# Patient Record
Sex: FEMALE | Race: WHITE | URBAN - METROPOLITAN AREA
[De-identification: names, ages, dates, MRNs, and addresses within clinical notes are randomized per-mention and may not be internally consistent; named-entity substitution may affect disease eponyms.]

---

## 2024-07-01 ENCOUNTER — DOCUMENTATION ONLY (OUTPATIENT)
Dept: GERIATRICS | Facility: CLINIC | Age: 74
End: 2024-07-01

## 2024-07-01 ENCOUNTER — TRANSITIONAL CARE UNIT VISIT (OUTPATIENT)
Dept: GERIATRICS | Facility: CLINIC | Age: 74
End: 2024-07-01
Payer: COMMERCIAL

## 2024-07-01 VITALS
RESPIRATION RATE: 17 BRPM | WEIGHT: 216.6 LBS | DIASTOLIC BLOOD PRESSURE: 68 MMHG | OXYGEN SATURATION: 94 % | SYSTOLIC BLOOD PRESSURE: 105 MMHG | HEART RATE: 70 BPM | TEMPERATURE: 97.4 F

## 2024-07-01 DIAGNOSIS — D62 ACUTE BLOOD LOSS AS CAUSE OF POSTOPERATIVE ANEMIA: ICD-10-CM

## 2024-07-01 DIAGNOSIS — M97.8XXD PERIPROSTHETIC FRACTURE OF FEMUR FOLLOWING TOTAL REPLACEMENT OF HIP, SUBSEQUENT ENCOUNTER: ICD-10-CM

## 2024-07-01 DIAGNOSIS — M06.9 RHEUMATOID ARTHRITIS INVOLVING MULTIPLE SITES, UNSPECIFIED WHETHER RHEUMATOID FACTOR PRESENT (H): ICD-10-CM

## 2024-07-01 DIAGNOSIS — M15.0 PRIMARY OSTEOARTHRITIS INVOLVING MULTIPLE JOINTS: ICD-10-CM

## 2024-07-01 DIAGNOSIS — Z47.89 ORTHOPEDIC AFTERCARE: ICD-10-CM

## 2024-07-01 DIAGNOSIS — Z96.649 PERIPROSTHETIC FRACTURE OF FEMUR FOLLOWING TOTAL REPLACEMENT OF HIP, SUBSEQUENT ENCOUNTER: ICD-10-CM

## 2024-07-01 DIAGNOSIS — G60.9 IDIOPATHIC PERIPHERAL NEUROPATHY: ICD-10-CM

## 2024-07-01 DIAGNOSIS — Z96.642 STATUS POST TOTAL REPLACEMENT OF LEFT HIP: Primary | ICD-10-CM

## 2024-07-01 PROBLEM — M05.79 RHEUMATOID ARTHRITIS INVOLVING MULTIPLE SITES WITH POSITIVE RHEUMATOID FACTOR (H): Status: RESOLVED | Noted: 2024-07-01 | Resolved: 2024-07-01

## 2024-07-01 PROBLEM — M05.79 RHEUMATOID ARTHRITIS INVOLVING MULTIPLE SITES WITH POSITIVE RHEUMATOID FACTOR (H): Status: ACTIVE | Noted: 2024-07-01

## 2024-07-01 PROBLEM — S72.22XD CLOSED SUBTROCHANTERIC FRACTURE OF LEFT FEMUR WITH ROUTINE HEALING: Status: ACTIVE | Noted: 2024-07-01

## 2024-07-01 PROCEDURE — 99310 SBSQ NF CARE HIGH MDM 45: CPT | Performed by: NURSE PRACTITIONER

## 2024-07-01 RX ORDER — ASPIRIN 81 MG/1
81 TABLET ORAL 2 TIMES DAILY WITH MEALS
COMMUNITY

## 2024-07-01 RX ORDER — POTASSIUM CHLORIDE 1500 MG/1
20 TABLET, EXTENDED RELEASE ORAL DAILY
COMMUNITY

## 2024-07-01 RX ORDER — ISOSORBIDE DINITRATE 20 MG/1
20 TABLET ORAL
COMMUNITY

## 2024-07-01 RX ORDER — ACETAMINOPHEN 500 MG
1000 TABLET ORAL 3 TIMES DAILY
COMMUNITY
End: 2024-08-30 | Stop reason: DRUGHIGH

## 2024-07-01 RX ORDER — FUROSEMIDE 40 MG
40 TABLET ORAL DAILY
COMMUNITY

## 2024-07-01 RX ORDER — UBIDECARENONE 100 MG
100 CAPSULE ORAL DAILY
COMMUNITY

## 2024-07-01 RX ORDER — ATENOLOL 50 MG/1
50 TABLET ORAL 2 TIMES DAILY
COMMUNITY

## 2024-07-01 RX ORDER — NITROGLYCERIN 0.4 MG/1
0.4 TABLET SUBLINGUAL EVERY 5 MIN PRN
COMMUNITY

## 2024-07-01 RX ORDER — AMOXICILLIN 500 MG/1
2000 CAPSULE ORAL
COMMUNITY

## 2024-07-01 RX ORDER — OXYCODONE HYDROCHLORIDE 5 MG/1
5 CAPSULE ORAL EVERY 4 HOURS PRN
COMMUNITY
End: 2024-07-03

## 2024-07-01 RX ORDER — TRAZODONE HYDROCHLORIDE 100 MG/1
100 TABLET ORAL AT BEDTIME
COMMUNITY

## 2024-07-01 RX ORDER — HYDROXYCHLOROQUINE SULFATE 200 MG/1
200 TABLET, FILM COATED ORAL 2 TIMES DAILY
COMMUNITY

## 2024-07-01 RX ORDER — ROSUVASTATIN CALCIUM 20 MG/1
20 TABLET, COATED ORAL DAILY
COMMUNITY

## 2024-07-01 RX ORDER — IBUPROFEN 600 MG/1
600 TABLET, FILM COATED ORAL EVERY 6 HOURS PRN
COMMUNITY

## 2024-07-01 RX ORDER — FOLIC ACID 1 MG/1
2 TABLET ORAL DAILY
COMMUNITY

## 2024-07-01 RX ORDER — DOCUSATE SODIUM 100 MG/1
100 CAPSULE, LIQUID FILLED ORAL DAILY
COMMUNITY
End: 2024-07-05

## 2024-07-01 RX ORDER — METHOCARBAMOL 500 MG/1
500 TABLET, FILM COATED ORAL EVERY 6 HOURS PRN
COMMUNITY

## 2024-07-01 NOTE — LETTER
7/1/2024      Lana Moyer  [address] [facility] [id number]        [facility] [place]  PCP & CLINIC: No primary care provider on file.  Mary MRN: [id number]. Place of Service where encounter took place[facility] Lana Moyer  is a 73 year old  (1950), admitted to the above facility from  [facility] . Hospital stay 6/22 through 6/28. Admitted to this facility for rehab, medical management, and nursing care. HPI information obtained from: facility chart records, facility staff, patient report, [facility] chart review, and Care Everywhere [facility] chart review.     Brief Summary of Hospital Course: Lana Victor was admitted to [facility] on 6/22 after sustaining a periprosthetic, spiral fracture to her left femur. She underwent total hip arthroplasty and experienced acute surgical blood loss requring transfusion of 1 unit of pRBCs.     Updates since admission to transitional care unit: Lana Victor presented to TCU on 6/28/24 s/p the above hospitalization. Today, Lana Victor feels alright and is looking forward to beginning therapy. Her pain is adequately controlled but she states she is utilizing all of her pain medications regularly (Ibuprofen, Tylenol, methocarbamol, and oxycodone). Her pain is present at the left hip and all the way to the left knee and is rated 4/10 at the time of the visit. She denies feeling feverish, confused, dizzy, lightheaded or fatigued. Bilateral leg and foot tingling unchanged from patient's baseline from before surgery. Swelling of hands, legs, and feet is also normal today compared to her normal level of swelling. She has difficulty breathing when walking about one block or climbing a flight of stairs at baseline. She denies chest pain or heart skipping beats or beating out of her chest. Last bowel movement was today. No nausea, diarrhea or abdominal pain.    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), trigger thumb and long finger release (2009), right shoulder arthroscopy with acromioplasty (2017), L4-S1 posterior decompression and fusion (2019), L2-L4 posterior decompression, L3-L4 fusion (2022), Coronary stent placement to mid LAD, Right ring and long finger pulley release (2013), total knee arthroplasty, left (2019), total knee arthroplasty, right (2018), hip replacement, left (2023)  FAMILY HISTORY: Mother: heart disease, a fib, hypertension, arthritis; Brother: Hypertension, hyperlipidemia, coronary artery disease; Sister: Coronary artery disease with stent placement, hypertension  SOCIAL HISTORY:  Previous smoker, quit 18 years ago. No illicit drug use. Drinks alcohol occasionally, socially. Lives alone, with  living in long-term care facility. Daughter lives nearby and was primary caregiver after Lana's previous total hip surgery.   reports that she has quit smoking. Her smoking use included cigarettes. She does not have any smokeless tobacco history on file.  Post Discharge Medication Reconciliation Status: discharge medications reconciled and changed, per note/orders.  Current Outpatient Medications   Medication Sig Dispense Refill     acetaminophen (TYLENOL) 500 MG tablet Take 500-1,000 mg by mouth every 6 hours as needed for pain       amoxicillin (AMOXIL) 500 MG capsule Take 2,000 mg by mouth once as needed (1 hour prior to dental  appointment)       aspirin 81 MG EC tablet Take 81 mg by mouth 2 times daily (with meals)       atenolol (TENORMIN) 50 MG tablet Take 50 mg by mouth 2 times daily       Calcium Carb-Cholecalciferol (OS-MARLA CALCIUM + D3 PO) Take 1 tablet by mouth 2 times daily       co-enzyme Q-10 100 MG CAPS capsule Take 100 mg by mouth daily       docusate sodium (COLACE) 100 MG capsule Take 100 mg by mouth daily       DULoxetine HCl 60 MG CSDR Take 60 mg by mouth daily       folic acid (FOLVITE) 1 MG tablet Take 2 mg by mouth daily       furosemide (LASIX) 40 MG tablet Take 40 mg by mouth daily       Glucosamine-Chondroitin 166.7-133.3 MG CAPS Take 1 capsule by mouth 2 times daily       hydroxychloroquine (PLAQUENIL) 200 MG tablet Take 200 mg by mouth 2 times daily       ibuprofen (ADVIL/MOTRIN) 600 MG tablet Take 600 mg by mouth every 6 hours as needed for moderate pain       isosorbide dinitrate (ISORDIL) 20 MG tablet Take 20 mg by mouth 3 times daily       methocarbamol (ROBAXIN) 500 MG tablet Take 500 mg by mouth every 6 hours as needed for muscle spasms (1st line pain management)       methotrexate 1.25 MG half-tablet Take 20 mg by mouth once a week       nitroGLYcerin (NITROSTAT) 0.4 MG sublingual tablet Place 0.4 mg under the tongue every 5 minutes as needed for chest pain For chest pain place 1 tablet under the tongue every 5 minutes for 3 doses. If symptoms persist 5 minutes after 1st dose call 911.       oxyCODONE (OXY-IR) 5 MG capsule Take 1 capsule (5 mg) by mouth every 4 hours as needed for severe pain       potassium chloride ER (K-TAB) 20 MEQ CR tablet Take 20 mEq by mouth daily       rosuvastatin (CRESTOR) 20 MG tablet Take 20 mg by mouth daily       traZODone (DESYREL) 100 MG tablet Take 100 mg by mouth at bedtime       ROS: 10 point ROS of systems including Constitutional, Eyes, Respiratory, Cardiovascular, Gastroenterology, Genitourinary, Integumentary, Musculoskeletal, Psychiatric were all negative except for  pertinent positives noted in my HPI.    Vitals: /68 mmHg  Pulse 70 bpm  Temp 97.4 F  Resp 17  Exam:  GENERAL APPEARANCE: Alert, in no distress, cooperative  ENT: Mouth/posterior oropharynx intact w/ moist mucous membranes, hearing acuity [place] at baseline, using hearing aides.  EYES: EOM, conjunctivae, lids, pupils and irises normal  RESP: Respiratory effort normal, no respiratory distress, Lung sounds clear. On RA.   CV: Auscultation of heart reveals S1, S2, rate and rhythm regular, no murmur, no rub or gallop, Edema +1 BUE, +2 LLE, +1 RLE. Peripheral pulses are 2+. BLE extremities warm to the touch.  ABDOMEN: Bowel sounds active, abdomen is soft and non-tender  SKIN: Inspection/Palpation of skin and subcutaneous tissue baseline w/ fragility. Surgical incision L hip covered in dressing. Mild edema and tenderness to touch, but no ecchymoses or inflammation concerning for infection.  NEURO: Alert and oriented to all spheres. Cranial nerves II-XII grossly intact.  PSYCH: Insight, judgement, and memory are intact, affect and mood are pleasant and engaged, emotional about recurrent falls.    Lab/Diagnostic data: Recent labs in [facility] reviewed by me today.     ASSESSMENT/PLAN:  Primary osteoarthritis involving multiple joints  Status post total replacement of left hip  Periprosthetic fracture of femur following total replacement of hip, subsequent encounter  Orthopedic aftercare  Acute blood loss as cause of postoperative anemia  Rheumatoid arthritis involving multiple sites, unspecified whether rheumatoid factor present (H)  Idiopathic peripheral neuropathy  Acute on chronic. Complex.  Provider reviewed records from hospitalization, facility, and interpreted most recent imaging/lab work (such as Hgb, potassium, CMP), and vital signs, each with unique characteristics requiring MDM.   Provider discussed advanced care planning directly w/ Lana Victor. She has elected to be full code status, RACHNA signed on site and order  entered int EPIC.   Discharge orders for potassium have not been drawn yet despite orders for today:  Will obtain CBC and CMP to evaluate for hypokalemia, ongoing anemia, or any other post-op disturbance (especially given her post op blood loss requiring blood transfusion).   CMP to monitor effects of polypharmacy: Tylenol, methotrexate, aspirin affecting liver function; Oxycodone, ibuprofen, lasix affecting renal function.  Patient experiencing polypharmacy which could certainly contribute to recurrent falls:  Duloxetine is prescribed at higher than normal recommended dosing for peripheral neuropathy and rarely effective in doses over 60mg.   Discussed this with Lana Victor, who is open to having this medication decreased, as outlined below.  Recommended discontinuation of Coenzyme Q10 given lack of evidence to support its use. Lana Victor opts to continue this medication.  Trazodone 100 mg may contribute to falls, especially at night. She denies daytime sleepiness and has not had falls at night. She would like to continue trazodone at the current dose.  Considering narcotic use, decreased mobility from baseline, and changes in nutrition, which all increase risk of constipation.  Add senna-S as outlined below.   Pain management. Currently adequately controlled.  Continue with PRN ibuprofen, acetaminophen, methocarbamol. She has not used the 10 mg dose of oxycodone since arrival.   Will reduce oxycodone as outlined below.  If pain becomes stronger, especially as therapy picks up, consider scheduling methocarbamol, as this is her first-line pain management per orthopedics note.  Ortho surgery follow up with Dr. Clarke's team has been scheduled by Lana Victor in the coming week.  Noting weight gain over the weekend. Swelling of BLE apparently baseline for her. Reassuring that there is no acute change in cardiopulmonary status (eg, crackles on auscultation or increased dyspnea on exertion)  Referral for OT lymphedema assessment  and treatment  Follow up w/in 1 week or as needed.    Orders:  CMP x1, routine on 7/3; Dx: Polypharmacy, hypokalemia  CBC x1, routine on 7/3; Dx: Surgical blood loss anemia; VTE prophylaxis  OT lymphedema evaluate and treat x1; Dx: Edema  Reduce duloxetine to 60 mg PO daily; Dx: Neuropathy, depression  Senna-S 1 tablet BID PRN; Dx: Constipation   Decrease oxycodone to 5 mg PO Q4h PRN; Dx: Severe pain    Total time spent with patient visit was 45 min including patient visit, review of past records, and care coordination as noted above.     Christa Lr RN, Student YVETTE    I (the provider) was present with professional student who participated in this service and in the documentation of this service. I have verified the HPI/ROS, history, and personally performed the physical exam and the healthcare decision making. I agree with all elements as documented above.      Electronically signed by:  JOHN Willett CNP DNP        Sincerely,        JOHN Moe CNP

## 2024-07-01 NOTE — PROGRESS NOTES
[facility]  PCP & CLINIC: No primary care provider on file.  Mary MRN: [id number]. Place of Service where encounter took place:  [facility]. Lana Moyer  is a 73 year old  (1950), admitted to the above facility from  [facility] . Hospital stay 6/22 through 6/28. Admitted to this facility for rehab, medical management, and nursing care. HPI information obtained from: facility chart records, facility staff, patient report, [facility] chart review, and Care Everywhere [facility] chart review.     Brief Summary of Hospital Course: Lana Victor was admitted to [facility] on 6/22 after sustaining a periprosthetic, spiral fracture to her left femur. She underwent total hip arthroplasty and experienced acute surgical blood loss requring transfusion of 1 unit of pRBCs.     Updates since admission to transitional care unit: Lana Victor presented to TCU on 6/28/24 s/p the above hospitalization. Today, Lana Victor feels alright and is looking forward to beginning therapy. Her pain is adequately controlled but she states she is utilizing all of her pain medications regularly (Ibuprofen, Tylenol, methocarbamol, and oxycodone). Her pain is present at the left hip and all the way to the left knee and is rated 4/10 at the time of the visit. She denies feeling feverish, confused, dizzy, lightheaded or fatigued. Bilateral leg and foot tingling unchanged from patient's baseline from before surgery. Swelling of hands, legs, and feet is also normal today compared to her normal level of swelling. She has difficulty breathing when walking about one block or climbing a flight of stairs at baseline. She denies chest pain or heart skipping beats or beating out of her chest. Last bowel movement was today. No nausea, diarrhea or abdominal pain.    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), trigger thumb and long finger release (2009), right shoulder arthroscopy with acromioplasty (2017), L4-S1 posterior decompression and fusion (2019), L2-L4 posterior decompression, L3-L4 fusion (2022), Coronary stent placement to mid LAD, Right ring and long finger pulley release (2013), total knee arthroplasty, left (2019), total knee arthroplasty, right (2018), hip replacement, left (2023)  FAMILY HISTORY: Mother: heart disease, a fib, hypertension, arthritis; Brother: Hypertension, hyperlipidemia, coronary artery disease; Sister: Coronary artery disease with stent placement, hypertension  SOCIAL HISTORY:  Previous smoker, quit 18 years ago. No illicit drug use. Drinks alcohol occasionally, socially. Lives alone, with  living in long-term care facility. Daughter lives nearby and was primary caregiver after Lana's previous total hip surgery.   reports that she has quit smoking. Her smoking use included cigarettes. She does not have any smokeless tobacco history on file.  Post Discharge Medication Reconciliation Status: discharge medications reconciled and changed, per note/orders.  Current Outpatient Medications   Medication Sig Dispense Refill    acetaminophen (TYLENOL) 500 MG tablet Take 500-1,000 mg by mouth every 6 hours as needed for pain      amoxicillin (AMOXIL) 500 MG capsule Take 2,000 mg by mouth once as needed (1 hour prior to dental appointment)      aspirin 81 MG EC tablet Take 81 mg by mouth 2 times daily (with  meals)      atenolol (TENORMIN) 50 MG tablet Take 50 mg by mouth 2 times daily      Calcium Carb-Cholecalciferol (OS-MARLA CALCIUM + D3 PO) Take 1 tablet by mouth 2 times daily      co-enzyme Q-10 100 MG CAPS capsule Take 100 mg by mouth daily      docusate sodium (COLACE) 100 MG capsule Take 100 mg by mouth daily      DULoxetine HCl 60 MG CSDR Take 60 mg by mouth daily      folic acid (FOLVITE) 1 MG tablet Take 2 mg by mouth daily      furosemide (LASIX) 40 MG tablet Take 40 mg by mouth daily      Glucosamine-Chondroitin 166.7-133.3 MG CAPS Take 1 capsule by mouth 2 times daily      hydroxychloroquine (PLAQUENIL) 200 MG tablet Take 200 mg by mouth 2 times daily      ibuprofen (ADVIL/MOTRIN) 600 MG tablet Take 600 mg by mouth every 6 hours as needed for moderate pain      isosorbide dinitrate (ISORDIL) 20 MG tablet Take 20 mg by mouth 3 times daily      methocarbamol (ROBAXIN) 500 MG tablet Take 500 mg by mouth every 6 hours as needed for muscle spasms (1st line pain management)      methotrexate 1.25 MG half-tablet Take 20 mg by mouth once a week      nitroGLYcerin (NITROSTAT) 0.4 MG sublingual tablet Place 0.4 mg under the tongue every 5 minutes as needed for chest pain For chest pain place 1 tablet under the tongue every 5 minutes for 3 doses. If symptoms persist 5 minutes after 1st dose call 911.      oxyCODONE (OXY-IR) 5 MG capsule Take 1 capsule (5 mg) by mouth every 4 hours as needed for severe pain      potassium chloride ER (K-TAB) 20 MEQ CR tablet Take 20 mEq by mouth daily      rosuvastatin (CRESTOR) 20 MG tablet Take 20 mg by mouth daily      traZODone (DESYREL) 100 MG tablet Take 100 mg by mouth at bedtime       ROS: 10 point ROS of systems including Constitutional, Eyes, Respiratory, Cardiovascular, Gastroenterology, Genitourinary, Integumentary, Musculoskeletal, Psychiatric were all negative except for pertinent positives noted in my HPI.    Vitals: /68 mmHg  Pulse 70 bpm  Temp 97.4 F  Resp  17  Exam:  GENERAL APPEARANCE: Alert, in no distress, cooperative  ENT: Mouth/posterior oropharynx intact w/ moist mucous membranes, hearing acuity [facility] at baseline, using hearing aides.  EYES: EOM, conjunctivae, lids, pupils and irises normal  RESP: Respiratory effort normal, no respiratory distress, Lung sounds clear. On RA.   CV: Auscultation of heart reveals S1, S2, rate and rhythm regular, no murmur, no rub or gallop, Edema +1 BUE, +2 LLE, +1 RLE. Peripheral pulses are 2+. BLE extremities warm to the touch.  ABDOMEN: Bowel sounds active, abdomen is soft and non-tender  SKIN: Inspection/Palpation of skin and subcutaneous tissue baseline w/ fragility. Surgical incision L hip covered in dressing. Mild edema and tenderness to touch, but no ecchymoses or inflammation concerning for infection.  NEURO: Alert and oriented to all spheres. Cranial nerves II-XII grossly intact.  PSYCH: Insight, judgement, and memory are intact, affect and mood are pleasant and engaged, emotional about recurrent falls.    Lab/Diagnostic data: Recent labs in [facility] reviewed by me today.     ASSESSMENT/PLAN:  Primary osteoarthritis involving multiple joints  Status post total replacement of left hip  Periprosthetic fracture of femur following total replacement of hip, subsequent encounter  Orthopedic aftercare  Acute blood loss as cause of postoperative anemia  Rheumatoid arthritis involving multiple sites, unspecified whether rheumatoid factor present (H)  Idiopathic peripheral neuropathy  Acute on chronic. Complex.  Provider reviewed records from hospitalization, facility, and interpreted most recent imaging/lab work (such as Hgb, potassium, CMP), and vital signs, each with unique characteristics requiring MDM.   Provider discussed advanced care planning directly w/ Lana Victor. She has elected to be full code status, RACHNA signed on site and order entered int EPIC.   Discharge orders for potassium have not been drawn yet despite orders for  today:  Will obtain CBC and CMP to evaluate for hypokalemia, ongoing anemia, or any other post-op disturbance (especially given her post op blood loss requiring blood transfusion).   CMP to monitor effects of polypharmacy: Tylenol, methotrexate, aspirin affecting liver function; Oxycodone, ibuprofen, lasix affecting renal function.  Patient experiencing polypharmacy which could certainly contribute to recurrent falls:  Duloxetine is prescribed at higher than normal recommended dosing for peripheral neuropathy and rarely effective in doses over 60mg.   Discussed this with Lana Victor, who is open to having this medication decreased, as outlined below.  Recommended discontinuation of Coenzyme Q10 given lack of evidence to support its use. Lana Vcitor opts to continue this medication.  Trazodone 100 mg may contribute to falls, especially at night. She denies daytime sleepiness and has not had falls at night. She would like to continue trazodone at the current dose.  Considering narcotic use, decreased mobility from baseline, and changes in nutrition, which all increase risk of constipation.  Add senna-S as outlined below.   Pain management. Currently adequately controlled.  Continue with PRN ibuprofen, acetaminophen, methocarbamol. She has not used the 10 mg dose of oxycodone since arrival.   Will reduce oxycodone as outlined below.  If pain becomes stronger, especially as therapy picks up, consider scheduling methocarbamol, as this is her first-line pain management per orthopedics note.  Ortho surgery follow up with Dr. Clarke's team has been scheduled by Lana Victor in the coming week.  Noting weight gain over the weekend. Swelling of BLE apparently baseline for her. Reassuring that there is no acute change in cardiopulmonary status (eg, crackles on auscultation or increased dyspnea on exertion)  Referral for OT lymphedema assessment and treatment  Follow up w/in 1 week or as needed.    Orders:  CMP x1, routine on 7/3; Dx:  Polypharmacy, hypokalemia  CBC x1, routine on 7/3; Dx: Surgical blood loss anemia; VTE prophylaxis  OT lymphedema evaluate and treat x1; Dx: Edema  Reduce duloxetine to 60 mg PO daily; Dx: Neuropathy, depression  Senna-S 1 tablet BID PRN; Dx: Constipation   Decrease oxycodone to 5 mg PO Q4h PRN; Dx: Severe pain    Total time spent with patient visit was 45 min including patient visit, review of past records, and care coordination as noted above.     Christa Lr RN, Student FNP    I (the provider) was present with professional student who participated in this service and in the documentation of this service. I have verified the HPI/ROS, history, and personally performed the physical exam and the healthcare decision making. I agree with all elements as documented above.      Electronically signed by:  JOHN Willett CNP DNP

## 2024-07-02 ENCOUNTER — LAB REQUISITION (OUTPATIENT)
Dept: LAB | Facility: CLINIC | Age: 74
End: 2024-07-02

## 2024-07-02 DIAGNOSIS — D64.9 ANEMIA, UNSPECIFIED: ICD-10-CM

## 2024-07-02 DIAGNOSIS — E87.6 HYPOKALEMIA: ICD-10-CM

## 2024-07-03 LAB
ALBUMIN SERPL BCG-MCNC: 2.9 G/DL (ref 3.5–5.2)
ALP SERPL-CCNC: 68 U/L (ref 40–150)
ALT SERPL W P-5'-P-CCNC: 36 U/L (ref 0–50)
ANION GAP SERPL CALCULATED.3IONS-SCNC: 9 MMOL/L (ref 7–15)
AST SERPL W P-5'-P-CCNC: 38 U/L (ref 0–45)
BILIRUB SERPL-MCNC: 0.4 MG/DL
BUN SERPL-MCNC: 13.6 MG/DL (ref 8–23)
CALCIUM SERPL-MCNC: 8.4 MG/DL (ref 8.8–10.2)
CHLORIDE SERPL-SCNC: 104 MMOL/L (ref 98–107)
CREAT SERPL-MCNC: 0.69 MG/DL (ref 0.51–0.95)
DEPRECATED HCO3 PLAS-SCNC: 25 MMOL/L (ref 22–29)
EGFRCR SERPLBLD CKD-EPI 2021: >90 ML/MIN/1.73M2
ERYTHROCYTE [DISTWIDTH] IN BLOOD BY AUTOMATED COUNT: 14.7 % (ref 10–15)
GLUCOSE SERPL-MCNC: 93 MG/DL (ref 70–99)
HCT VFR BLD AUTO: 22.7 % (ref 35–47)
HGB BLD-MCNC: 7 G/DL (ref 11.7–15.7)
MCH RBC QN AUTO: 31.1 PG (ref 26.5–33)
MCHC RBC AUTO-ENTMCNC: 30.8 G/DL (ref 31.5–36.5)
MCV RBC AUTO: 101 FL (ref 78–100)
PLATELET # BLD AUTO: 218 10E3/UL (ref 150–450)
POTASSIUM SERPL-SCNC: 3.7 MMOL/L (ref 3.4–5.3)
PROT SERPL-MCNC: 5.4 G/DL (ref 6.4–8.3)
RBC # BLD AUTO: 2.25 10E6/UL (ref 3.8–5.2)
SODIUM SERPL-SCNC: 138 MMOL/L (ref 135–145)
WBC # BLD AUTO: 6.8 10E3/UL (ref 4–11)

## 2024-07-03 PROCEDURE — 36415 COLL VENOUS BLD VENIPUNCTURE: CPT | Performed by: FAMILY MEDICINE

## 2024-07-03 PROCEDURE — P9604 ONE-WAY ALLOW PRORATED TRIP: HCPCS | Performed by: FAMILY MEDICINE

## 2024-07-03 PROCEDURE — 80053 COMPREHEN METABOLIC PANEL: CPT | Performed by: FAMILY MEDICINE

## 2024-07-03 PROCEDURE — 85027 COMPLETE CBC AUTOMATED: CPT | Performed by: FAMILY MEDICINE

## 2024-07-03 RX ORDER — OXYCODONE HYDROCHLORIDE 5 MG/1
5 CAPSULE ORAL EVERY 4 HOURS PRN
Status: SHIPPED
Start: 2024-07-03 | End: 2024-07-05

## 2024-07-03 NOTE — PROGRESS NOTES
[facility] GERIATRIC SERVICE  Episodic/Acute/Follow-Up  [place] MRN: [id number]. Place of Service where encounter took place:  [facility] ([facility]) [[id number]]   Chief Complaint   Patient presents with    RECHECK    HPI: Lana Moyer  is a 73 year old (1950), who is being seen today for an episodic care visit. Today's concern is:    Lana Victor seen today on routine follow up as she continues to rehab in TCU. During our last visit, he establish care via admission, trended blood work, reduced duloxetine, and adjusted pain/bowel regimen.    Today, Lana says that she is still constipated.  She is having bowel movements, but not as regular as she should and feels like she should empty more.  She denies bladder dysfunction.  She denies headaches, dizziness, shortness of breath, chest pain, fever, chills.  She feels she has a little bit of swelling in the left lower extremity still, but her pain is overall improving.  She likes most of the therapy that she is getting, and feels like she is making progress.  She denies any blood loss from stool or urine, and she denies any drainage from incision.  She denies feeling weaker than usual.    Past Medical and Surgical History reviewed in Epic today.  MEDICATIONS:  Current Outpatient Medications   Medication Sig Dispense Refill    ferrous sulfate (FE TABS) 325 (65 Fe) MG EC tablet Take 325 mg by mouth daily      oxyCODONE (OXY-IR) 5 MG capsule Take 1 capsule (5 mg) by mouth every 6 hours as needed for severe pain 15 capsule 0    senna-docusate (SENOKOT-S/PERICOLACE) 8.6-50 MG tablet Take 1 tablet by mouth 2 times daily +BID PRN      acetaminophen (TYLENOL) 500 MG tablet Take 1,000 mg by mouth 2 times daily +1000mg PO Qday PRN      amoxicillin (AMOXIL) 500 MG capsule Take 2,000 mg by mouth once as needed (1 hour prior to dental appointment)      aspirin 81 MG EC tablet Take 81 mg by mouth 2 times daily (with meals)      atenolol (TENORMIN) 50 MG tablet Take 50 mg by  "mouth 2 times daily      Calcium Carb-Cholecalciferol (OS-MARLA CALCIUM + D3 PO) Take 1 tablet by mouth 2 times daily      co-enzyme Q-10 100 MG CAPS capsule Take 100 mg by mouth daily      DULoxetine HCl 60 MG CSDR Take 60 mg by mouth daily      folic acid (FOLVITE) 1 MG tablet Take 2 mg by mouth daily      furosemide (LASIX) 40 MG tablet Take 40 mg by mouth daily      Glucosamine-Chondroitin 166.7-133.3 MG CAPS Take 1 capsule by mouth 2 times daily      hydroxychloroquine (PLAQUENIL) 200 MG tablet Take 200 mg by mouth 2 times daily      ibuprofen (ADVIL/MOTRIN) 600 MG tablet Take 600 mg by mouth every 6 hours as needed for moderate pain      isosorbide dinitrate (ISORDIL) 20 MG tablet Take 20 mg by mouth 3 times daily      methocarbamol (ROBAXIN) 500 MG tablet Take 500 mg by mouth every 6 hours as needed for muscle spasms (1st line pain management)      methotrexate 1.25 MG half-tablet Take 20 mg by mouth once a week      nitroGLYcerin (NITROSTAT) 0.4 MG sublingual tablet Place 0.4 mg under the tongue every 5 minutes as needed for chest pain For chest pain place 1 tablet under the tongue every 5 minutes for 3 doses. If symptoms persist 5 minutes after 1st dose call 911.      potassium chloride ER (K-TAB) 20 MEQ CR tablet Take 20 mEq by mouth daily      rosuvastatin (CRESTOR) 20 MG tablet Take 20 mg by mouth daily      traZODone (DESYREL) 100 MG tablet Take 100 mg by mouth at bedtime       Objective: /65   Pulse 68   Temp 97.9  F (36.6  C)   Resp 16   Ht 1.626 m (5' 4\")   Wt 94.6 kg (208 lb 8 oz)   SpO2 97%   BMI 35.79 kg/m    Exam:  GENERAL APPEARANCE: Alert, in no distress, cooperative.   RESP: Respiratory effort good, no respiratory distress, On RA.   CV: Edema 1-2+ BLE. Peripheral pulses are 2+.  Skin: Incision is CDI/NUHA/approximated with glue.  PSYCH: Insight, judgement, and memory are baseline, affect and mood are happy/engaged.    ASSESSMENT/PLAN:  Status post total replacement of left " hip  Periprosthetic fracture of femur following total replacement of hip, subsequent encounter  Orthopedic aftercare  Acute blood loss as cause of postoperative anemia  Rheumatoid arthritis involving multiple sites, unspecified whether rheumatoid factor present (H)  Primary osteoarthritis involving multiple joints  Acute on chronic.  Tenuous.  Provider reviewed records from facility, and interpreted most recent imaging/lab work (such as CBC, MP)), and vital signs, each with their own characteristics requiring MDM.  Provider discussed care with rehabilitation team and nursing:  Rehab team shows that Lana is slow to respond to rehab.  Sometimes she is still requiring a mechanical lift and perhaps this is because of her toe-touch weightbearing restriction.  She will require more time.  Nursing reports moderate amount of opioid use for pain control, though Lana endorses pain improving.  Reduce oxycodone as noted below.  Schedule Tylenol to mitigate use of opioids.  Noting some ongoing constipation postoperatively.  Schedule senna as noted below while keeping PRN dosing as previously ordered.  Given the above, discontinue Colace.  Noting significant hemoglobin drop from prior.  No obvious signs of blood loss.  Hemoglobin dropped from 8.2-7.0.  Provider counseled Lana on potential for hospital transfer/blood transfusion if this does not improve and drops further, and also discussed monitoring in house.  Start FeSO4 as noted below.  This can constipate and be stomach upsetting, and patient was educated around this.  Take with food, and utilize stool softeners as needed.  Trend hemoglobin as noted below.  Follow up w/in 1 week or as needed.    Orders:  Decrease Oxycodone to 5mg PO Q6h PRN. Dx: severe pain.  Tylenol 1000mg PO BID. Dx: left hip pain.  Decrease PRN Tylenol to 1000mg PO Qday PRN. Dx: pain/fever.  Discontinue Colace.   Senna S, 1 tab PO BID. Dx: constipation. (Keep other PRN also).   FeSO4 325mg PO Qday. Dx:  postop anemia.   Recheck Hgb x1 on 7/8. Dx: postop anemia.     Electronically signed by:  Dr. Uyen Chahal, APRN CNP DNP

## 2024-07-05 ENCOUNTER — TRANSITIONAL CARE UNIT VISIT (OUTPATIENT)
Dept: GERIATRICS | Facility: CLINIC | Age: 74
End: 2024-07-05
Payer: COMMERCIAL

## 2024-07-05 VITALS
WEIGHT: 208.5 LBS | OXYGEN SATURATION: 97 % | TEMPERATURE: 97.9 F | SYSTOLIC BLOOD PRESSURE: 123 MMHG | RESPIRATION RATE: 16 BRPM | HEIGHT: 64 IN | BODY MASS INDEX: 35.59 KG/M2 | HEART RATE: 68 BPM | DIASTOLIC BLOOD PRESSURE: 65 MMHG

## 2024-07-05 DIAGNOSIS — M15.0 PRIMARY OSTEOARTHRITIS INVOLVING MULTIPLE JOINTS: ICD-10-CM

## 2024-07-05 DIAGNOSIS — Z96.642 STATUS POST TOTAL REPLACEMENT OF LEFT HIP: Primary | ICD-10-CM

## 2024-07-05 DIAGNOSIS — Z96.649 PERIPROSTHETIC FRACTURE OF FEMUR FOLLOWING TOTAL REPLACEMENT OF HIP, SUBSEQUENT ENCOUNTER: ICD-10-CM

## 2024-07-05 DIAGNOSIS — Z47.89 ORTHOPEDIC AFTERCARE: ICD-10-CM

## 2024-07-05 DIAGNOSIS — M97.8XXD PERIPROSTHETIC FRACTURE OF FEMUR FOLLOWING TOTAL REPLACEMENT OF HIP, SUBSEQUENT ENCOUNTER: ICD-10-CM

## 2024-07-05 DIAGNOSIS — M06.9 RHEUMATOID ARTHRITIS INVOLVING MULTIPLE SITES, UNSPECIFIED WHETHER RHEUMATOID FACTOR PRESENT (H): ICD-10-CM

## 2024-07-05 DIAGNOSIS — D62 ACUTE BLOOD LOSS AS CAUSE OF POSTOPERATIVE ANEMIA: ICD-10-CM

## 2024-07-05 PROCEDURE — 99310 SBSQ NF CARE HIGH MDM 45: CPT | Performed by: NURSE PRACTITIONER

## 2024-07-05 RX ORDER — AMOXICILLIN 250 MG
1 CAPSULE ORAL 2 TIMES DAILY
COMMUNITY

## 2024-07-05 RX ORDER — FERROUS SULFATE 325(65) MG
325 TABLET, DELAYED RELEASE (ENTERIC COATED) ORAL DAILY
COMMUNITY

## 2024-07-05 RX ORDER — OXYCODONE HYDROCHLORIDE 5 MG/1
5 CAPSULE ORAL EVERY 6 HOURS PRN
Qty: 15 CAPSULE | Refills: 0 | Status: SHIPPED | OUTPATIENT
Start: 2024-07-05 | End: 2024-07-18

## 2024-07-05 NOTE — LETTER
7/5/2024      Lana Moyer  [address] [facility] [address]        [facility] GERIATRIC SERVICE  Episodic/Acute/Follow-Up  [place] MRN: [id number]. Place of Service where encounter took place:  [facility] ([facility]) [[id number]]   Chief Complaint   Patient presents with     RECHECK    HPI: Lana Moyer  is a 73 year old (1950), who is being seen today for an episodic care visit. Today's concern is:    Lana Victor seen today on routine follow up as she continues to rehab in TCU. During our last visit, he establish care via admission, trended blood work, reduced duloxetine, and adjusted pain/bowel regimen.    Today, Lana says that she is still constipated.  She is having bowel movements, but not as regular as she should and feels like she should empty more.  She denies bladder dysfunction.  She denies headaches, dizziness, shortness of breath, chest pain, fever, chills.  She feels she has a little bit of swelling in the left lower extremity still, but her pain is overall improving.  She likes most of the therapy that she is getting, and feels like she is making progress.  She denies any blood loss from stool or urine, and she denies any drainage from incision.  She denies feeling weaker than usual.    Past Medical and Surgical History reviewed in Epic today.  MEDICATIONS:  Current Outpatient Medications   Medication Sig Dispense Refill     ferrous sulfate (FE TABS) 325 (65 Fe) MG EC tablet Take 325 mg by mouth daily       oxyCODONE (OXY-IR) 5 MG capsule Take 1 capsule (5 mg) by mouth every 6 hours as needed for severe pain 15 capsule 0     senna-docusate (SENOKOT-S/PERICOLACE) 8.6-50 MG tablet Take 1 tablet by mouth 2 times daily +BID PRN       acetaminophen (TYLENOL) 500 MG tablet Take 1,000 mg by mouth 2 times daily +1000mg PO Qday PRN       amoxicillin (AMOXIL) 500 MG capsule Take 2,000 mg by mouth once as needed (1 hour prior to dental appointment)       aspirin 81 MG EC tablet Take 81 mg by  "mouth 2 times daily (with meals)       atenolol (TENORMIN) 50 MG tablet Take 50 mg by mouth 2 times daily       Calcium Carb-Cholecalciferol (OS-MARLA CALCIUM + D3 PO) Take 1 tablet by mouth 2 times daily       co-enzyme Q-10 100 MG CAPS capsule Take 100 mg by mouth daily       DULoxetine HCl 60 MG CSDR Take 60 mg by mouth daily       folic acid (FOLVITE) 1 MG tablet Take 2 mg by mouth daily       furosemide (LASIX) 40 MG tablet Take 40 mg by mouth daily       Glucosamine-Chondroitin 166.7-133.3 MG CAPS Take 1 capsule by mouth 2 times daily       hydroxychloroquine (PLAQUENIL) 200 MG tablet Take 200 mg by mouth 2 times daily       ibuprofen (ADVIL/MOTRIN) 600 MG tablet Take 600 mg by mouth every 6 hours as needed for moderate pain       isosorbide dinitrate (ISORDIL) 20 MG tablet Take 20 mg by mouth 3 times daily       methocarbamol (ROBAXIN) 500 MG tablet Take 500 mg by mouth every 6 hours as needed for muscle spasms (1st line pain management)       methotrexate 1.25 MG half-tablet Take 20 mg by mouth once a week       nitroGLYcerin (NITROSTAT) 0.4 MG sublingual tablet Place 0.4 mg under the tongue every 5 minutes as needed for chest pain For chest pain place 1 tablet under the tongue every 5 minutes for 3 doses. If symptoms persist 5 minutes after 1st dose call 911.       potassium chloride ER (K-TAB) 20 MEQ CR tablet Take 20 mEq by mouth daily       rosuvastatin (CRESTOR) 20 MG tablet Take 20 mg by mouth daily       traZODone (DESYREL) 100 MG tablet Take 100 mg by mouth at bedtime       Objective: /65   Pulse 68   Temp 97.9  F (36.6  C)   Resp 16   Ht 1.626 m (5' 4\")   Wt 94.6 kg (208 lb 8 oz)   SpO2 97%   BMI 35.79 kg/m    Exam:  GENERAL APPEARANCE: Alert, in no distress, cooperative.   RESP: Respiratory effort good, no respiratory distress, On RA.   CV: Edema 1-2+ BLE. Peripheral pulses are 2+.  Skin: Incision is CDI/NUHA/approximated with glue.  PSYCH: Insight, judgement, and memory are baseline, " affect and mood are happy/engaged.    ASSESSMENT/PLAN:  Status post total replacement of left hip  Periprosthetic fracture of femur following total replacement of hip, subsequent encounter  Orthopedic aftercare  Acute blood loss as cause of postoperative anemia  Rheumatoid arthritis involving multiple sites, unspecified whether rheumatoid factor present (H)  Primary osteoarthritis involving multiple joints  Acute on chronic.  Tenuous.  Provider reviewed records from facility, and interpreted most recent imaging/lab work (such as CBC, MP)), and vital signs, each with their own characteristics requiring MDM.  Provider discussed care with rehabilitation team and nursing:  Rehab team shows that Lana is slow to respond to rehab.  Sometimes she is still requiring a mechanical lift and perhaps this is because of her toe-touch weightbearing restriction.  She will require more time.  Nursing reports moderate amount of opioid use for pain control, though Lana endorses pain improving.  Reduce oxycodone as noted below.  Schedule Tylenol to mitigate use of opioids.  Noting some ongoing constipation postoperatively.  Schedule senna as noted below while keeping PRN dosing as previously ordered.  Given the above, discontinue Colace.  Noting significant hemoglobin drop from prior.  No obvious signs of blood loss.  Hemoglobin dropped from 8.2-7.0.  Provider counseled Lana on potential for hospital transfer/blood transfusion if this does not improve and drops further, and also discussed monitoring in house.  Start FeSO4 as noted below.  This can constipate and be stomach upsetting, and patient was educated around this.  Take with food, and utilize stool softeners as needed.  Trend hemoglobin as noted below.  Follow up w/in 1 week or as needed.    Orders:  Decrease Oxycodone to 5mg PO Q6h PRN. Dx: severe pain.  Tylenol 1000mg PO BID. Dx: left hip pain.  Decrease PRN Tylenol to 1000mg PO Qday PRN. Dx: pain/fever.  Discontinue Colace.    Senna S, 1 tab PO BID. Dx: constipation. (Keep other PRN also).   FeSO4 325mg PO Qday. Dx: postop anemia.   Recheck Hgb x1 on 7/8. Dx: postop anemia.     Electronically signed by:  Dr. Uyen Chahal, APRN CNP DNP        Sincerely,        Uyen Chahal, JOHN CNP

## 2024-07-07 ENCOUNTER — LAB REQUISITION (OUTPATIENT)
Dept: LAB | Facility: CLINIC | Age: 74
End: 2024-07-07

## 2024-07-07 DIAGNOSIS — D64.9 ANEMIA, UNSPECIFIED: ICD-10-CM

## 2024-07-08 ENCOUNTER — TRANSITIONAL CARE UNIT VISIT (OUTPATIENT)
Dept: GERIATRICS | Facility: CLINIC | Age: 74
End: 2024-07-08
Payer: COMMERCIAL

## 2024-07-08 VITALS
SYSTOLIC BLOOD PRESSURE: 97 MMHG | TEMPERATURE: 97.7 F | OXYGEN SATURATION: 95 % | HEART RATE: 70 BPM | RESPIRATION RATE: 20 BRPM | DIASTOLIC BLOOD PRESSURE: 48 MMHG

## 2024-07-08 DIAGNOSIS — D62 ACUTE BLOOD LOSS AS CAUSE OF POSTOPERATIVE ANEMIA: ICD-10-CM

## 2024-07-08 DIAGNOSIS — Z96.649 PERIPROSTHETIC FRACTURE OF FEMUR FOLLOWING TOTAL REPLACEMENT OF HIP, SUBSEQUENT ENCOUNTER: ICD-10-CM

## 2024-07-08 DIAGNOSIS — Z47.89 ORTHOPEDIC AFTERCARE: ICD-10-CM

## 2024-07-08 DIAGNOSIS — M06.9 RHEUMATOID ARTHRITIS INVOLVING MULTIPLE SITES, UNSPECIFIED WHETHER RHEUMATOID FACTOR PRESENT (H): ICD-10-CM

## 2024-07-08 DIAGNOSIS — M97.8XXD PERIPROSTHETIC FRACTURE OF FEMUR FOLLOWING TOTAL REPLACEMENT OF HIP, SUBSEQUENT ENCOUNTER: ICD-10-CM

## 2024-07-08 DIAGNOSIS — Z96.642 STATUS POST TOTAL REPLACEMENT OF LEFT HIP: Primary | ICD-10-CM

## 2024-07-08 DIAGNOSIS — M15.0 PRIMARY OSTEOARTHRITIS INVOLVING MULTIPLE JOINTS: ICD-10-CM

## 2024-07-08 LAB
BASOPHILS # BLD AUTO: 0 10E3/UL (ref 0–0.2)
BASOPHILS NFR BLD AUTO: 1 %
EOSINOPHIL # BLD AUTO: 0.2 10E3/UL (ref 0–0.7)
EOSINOPHIL NFR BLD AUTO: 3 %
ERYTHROCYTE [DISTWIDTH] IN BLOOD BY AUTOMATED COUNT: 15.6 % (ref 10–15)
HCT VFR BLD AUTO: 23.4 % (ref 35–47)
HGB BLD-MCNC: 7.3 G/DL (ref 11.7–15.7)
HGB BLD-MCNC: 7.4 G/DL (ref 11.7–15.7)
IMM GRANULOCYTES # BLD: 0 10E3/UL
IMM GRANULOCYTES NFR BLD: 0 %
LYMPHOCYTES # BLD AUTO: 0.3 10E3/UL (ref 0.8–5.3)
LYMPHOCYTES NFR BLD AUTO: 6 %
MCH RBC QN AUTO: 31.2 PG (ref 26.5–33)
MCHC RBC AUTO-ENTMCNC: 31.2 G/DL (ref 31.5–36.5)
MCV RBC AUTO: 100 FL (ref 78–100)
MONOCYTES # BLD AUTO: 0.3 10E3/UL (ref 0–1.3)
MONOCYTES NFR BLD AUTO: 5 %
NEUTROPHILS # BLD AUTO: 5.1 10E3/UL (ref 1.6–8.3)
NEUTROPHILS NFR BLD AUTO: 86 %
NRBC # BLD AUTO: 0 10E3/UL
NRBC BLD AUTO-RTO: 0 /100
PLATELET # BLD AUTO: 203 10E3/UL (ref 150–450)
RBC # BLD AUTO: 2.34 10E6/UL (ref 3.8–5.2)
WBC # BLD AUTO: 6 10E3/UL (ref 4–11)

## 2024-07-08 PROCEDURE — 99310 SBSQ NF CARE HIGH MDM 45: CPT | Performed by: NURSE PRACTITIONER

## 2024-07-08 PROCEDURE — 85025 COMPLETE CBC W/AUTO DIFF WBC: CPT | Performed by: NURSE PRACTITIONER

## 2024-07-08 PROCEDURE — 85004 AUTOMATED DIFF WBC COUNT: CPT | Performed by: NURSE PRACTITIONER

## 2024-07-08 PROCEDURE — P9604 ONE-WAY ALLOW PRORATED TRIP: HCPCS | Performed by: NURSE PRACTITIONER

## 2024-07-08 NOTE — LETTER
7/8/2024      Lana Moyer  [address]  [address]  [facility] [address]        [facility] [place] GERIATRIC SERVICE  Episodic/Acute/Follow-Up  [place] MRN: [id number]. Place of Service where encounter took place:  [facility] ([facility]) [[id number]]   Chief Complaint   Patient presents with     RECHECK    HPI: Lana Moyer  is a 73 year old (1950), who is being seen today for an episodic care visit. Today's concern is:    Lana Victor seen today on routine follow up as she continues to rehab in TCU. During our last visit, we discussed significant anemia down to 7.0.  We started oxycodone reduction and adjunct of this with scheduled Tylenol.  We scheduled some senna and FeSO4 secondary to anemia and constipation.  Hemoglobin recheck is scheduled for today.    Today, Lana Victor is feeling pretty good.  Her bowels are moving a little bit better, and she feels like this will improve without further medication adjustments.  Her mood is stable, and she denies any anxiety or depression.  She feels like she has gained some weight, and endorses some lower extremity edema.  She does not feel short of breath, chest pain, palpitations, or other cardiac changes.  She does have compression in place today.  She denies any bladder problems, but points out some redness and irritation to the inferior aspect of her lateral left hip incision.  She denies fevers or chills, and actually feels like she is improving.    Past Medical and Surgical History reviewed in Epic today.  MEDICATIONS:  Current Outpatient Medications   Medication Sig Dispense Refill     acetaminophen (TYLENOL) 500 MG tablet Take 1,000 mg by mouth 2 times daily +1000mg PO Qday PRN       amoxicillin (AMOXIL) 500 MG capsule Take 2,000 mg by mouth once as needed (1 hour prior to dental appointment)       aspirin 81 MG EC tablet Take 81 mg by mouth 2 times daily (with meals)       atenolol (TENORMIN) 50 MG tablet Take 50 mg by mouth 2 times daily       Calcium  Carb-Cholecalciferol (OS-MARLA CALCIUM + D3 PO) Take 1 tablet by mouth 2 times daily       co-enzyme Q-10 100 MG CAPS capsule Take 100 mg by mouth daily       DULoxetine HCl 60 MG CSDR Take 60 mg by mouth daily       ferrous sulfate (FE TABS) 325 (65 Fe) MG EC tablet Take 325 mg by mouth daily       folic acid (FOLVITE) 1 MG tablet Take 2 mg by mouth daily       furosemide (LASIX) 40 MG tablet Take 40 mg by mouth daily       Glucosamine-Chondroitin 166.7-133.3 MG CAPS Take 1 capsule by mouth 2 times daily       hydroxychloroquine (PLAQUENIL) 200 MG tablet Take 200 mg by mouth 2 times daily       ibuprofen (ADVIL/MOTRIN) 600 MG tablet Take 400 mg by mouth 3 times daily as needed for moderate pain       isosorbide dinitrate (ISORDIL) 20 MG tablet Take 20 mg by mouth 3 times daily       methocarbamol (ROBAXIN) 500 MG tablet Take 500 mg by mouth every 6 hours as needed for muscle spasms (1st line pain management)       methotrexate 1.25 MG half-tablet Take 20 mg by mouth once a week       nitroGLYcerin (NITROSTAT) 0.4 MG sublingual tablet Place 0.4 mg under the tongue every 5 minutes as needed for chest pain For chest pain place 1 tablet under the tongue every 5 minutes for 3 doses. If symptoms persist 5 minutes after 1st dose call 911.       oxyCODONE (OXY-IR) 5 MG capsule Take 1 capsule (5 mg) by mouth every 6 hours as needed for severe pain 15 capsule 0     potassium chloride ER (K-TAB) 20 MEQ CR tablet Take 20 mEq by mouth daily       rosuvastatin (CRESTOR) 20 MG tablet Take 20 mg by mouth daily       senna-docusate (SENOKOT-S/PERICOLACE) 8.6-50 MG tablet Take 1 tablet by mouth 2 times daily +BID PRN       traZODone (DESYREL) 100 MG tablet Take 100 mg by mouth at bedtime       Objective: BP 97/48   Pulse 70   Temp 97.7  F (36.5  C)   Resp 20   SpO2 95%   Exam:  GENERAL APPEARANCE: Alert, in no distress, cooperative.   RESP: Respiratory effort good, no respiratory distress, Lung sounds clear. On RA.   CV:  Auscultation of heart reveals S1, S2, rate and rhythm regular, no murmur, no rub or gallop, Edema 2+ BLE. Peripheral pulses are 2+.  Skin: Incision is as noted here w/ new erythema and drainage:    PSYCH: Insight, judgement, and memory are forgetful at baseline, affect and mood are happy/engaged.    ASSESSMENT/PLAN:  Status post total replacement of left hip  Periprosthetic fracture of femur following total replacement of hip, subsequent encounter  Orthopedic aftercare  Acute blood loss as cause of postoperative anemia  Rheumatoid arthritis involving multiple sites, unspecified whether rheumatoid factor present (H)  Primary osteoarthritis involving multiple joints  Acute on chronic. Tenuous.  Provider reviewed records from facility, and interpreted most recent imaging/lab work (such as Hgb), and vital signs, each with their own characteristics requiring MDM.  Provider discussed care with nursing, rehabilitation team, and :  Lana Victor felt that her incision was rubbing against the wheelchair and that she needs a bigger wheelchair.  Rehab team to work on this.  Overall, her progress is slow secondary to deconditioning and weakness.  Provider discussed potential start of antibiotics for cellulitis on left hip incision, but given patient is not febrile and feels that this is irritation from rubbing on the wheelchair, she is competent that this can wait until she follows up with orthopedics on 7/10.  Provider then discussed care with nursing, and recommended that they alert orthopedics about this incision change.   states likely discharge back to [facility] community with services when patient is ready.  Hemoglobin improved from 7.0-7.4 today.  Continue FeSO4, and monitor for signs/symptoms of bleeding.  It is likely this is just postoperative anemia.  May consider discontinuation of FeSO4 in the future.  If hemoglobin drops further, we did discuss hospitalization and need for transfusion.  Given  incision changes, will ask for a full CBC to be added onto today's blood work.  If leukocytosis is present, would favor starting antibiotics before orthopedic appointment.  Noting that ibuprofen at higher dosing has been routinely used.  This can increase fluid retention which we are seeing.  Do not favor CHF exacerbation here, given there has not been extensive cardiac history, but notable that edema is present bilaterally.  Reduce ibuprofen dosing to hopefully reduce fluid retention.  Continue with daily weights.  Monitor for shortness of breath or respiratory compromise.  Follow up w/in 1 week or as needed.    Orders:  Add on full CBC to today's blood work. Dx: redness to incision.  Decrease Ibuprofen to 400mg PO TID PRN. Dx: severe pain.     Electronically signed by:  JOHN Willett CNP DNP        Sincerely,        JOHN Moe CNP

## 2024-07-08 NOTE — PROGRESS NOTES
[facility]  Episodic/Acute/Follow-Up  [place] MRN: [id number]. Place of Service where encounter took place:  [facility] ([facility]) [[id number]]   Chief Complaint   Patient presents with    RECHECK    HPI: Lana Moyer  is a 73 year old (1950), who is being seen today for an episodic care visit. Today's concern is:    Lana Victor seen today on routine follow up as she continues to rehab in TCU. During our last visit, we discussed significant anemia down to 7.0.  We started oxycodone reduction and adjunct of this with scheduled Tylenol.  We scheduled some senna and FeSO4 secondary to anemia and constipation.  Hemoglobin recheck is scheduled for today.    Today, Lana Victor is feeling pretty good.  Her bowels are moving a little bit better, and she feels like this will improve without further medication adjustments.  Her mood is stable, and she denies any anxiety or depression.  She feels like she has gained some weight, and endorses some lower extremity edema.  She does not feel short of breath, chest pain, palpitations, or other cardiac changes.  She does have compression in place today.  She denies any bladder problems, but points out some redness and irritation to the inferior aspect of her lateral left hip incision.  She denies fevers or chills, and actually feels like she is improving.    Past Medical and Surgical History reviewed in Epic today.  MEDICATIONS:  Current Outpatient Medications   Medication Sig Dispense Refill    acetaminophen (TYLENOL) 500 MG tablet Take 1,000 mg by mouth 2 times daily +1000mg PO Qday PRN      amoxicillin (AMOXIL) 500 MG capsule Take 2,000 mg by mouth once as needed (1 hour prior to dental appointment)      aspirin 81 MG EC tablet Take 81 mg by mouth 2 times daily (with meals)      atenolol (TENORMIN) 50 MG tablet Take 50 mg by mouth 2 times daily      Calcium Carb-Cholecalciferol (OS-MARLA CALCIUM + D3 PO) Take 1 tablet by mouth 2 times daily      co-enzyme Q-10  100 MG CAPS capsule Take 100 mg by mouth daily      DULoxetine HCl 60 MG CSDR Take 60 mg by mouth daily      ferrous sulfate (FE TABS) 325 (65 Fe) MG EC tablet Take 325 mg by mouth daily      folic acid (FOLVITE) 1 MG tablet Take 2 mg by mouth daily      furosemide (LASIX) 40 MG tablet Take 40 mg by mouth daily      Glucosamine-Chondroitin 166.7-133.3 MG CAPS Take 1 capsule by mouth 2 times daily      hydroxychloroquine (PLAQUENIL) 200 MG tablet Take 200 mg by mouth 2 times daily      ibuprofen (ADVIL/MOTRIN) 600 MG tablet Take 400 mg by mouth 3 times daily as needed for moderate pain      isosorbide dinitrate (ISORDIL) 20 MG tablet Take 20 mg by mouth 3 times daily      methocarbamol (ROBAXIN) 500 MG tablet Take 500 mg by mouth every 6 hours as needed for muscle spasms (1st line pain management)      methotrexate 1.25 MG half-tablet Take 20 mg by mouth once a week      nitroGLYcerin (NITROSTAT) 0.4 MG sublingual tablet Place 0.4 mg under the tongue every 5 minutes as needed for chest pain For chest pain place 1 tablet under the tongue every 5 minutes for 3 doses. If symptoms persist 5 minutes after 1st dose call 911.      oxyCODONE (OXY-IR) 5 MG capsule Take 1 capsule (5 mg) by mouth every 6 hours as needed for severe pain 15 capsule 0    potassium chloride ER (K-TAB) 20 MEQ CR tablet Take 20 mEq by mouth daily      rosuvastatin (CRESTOR) 20 MG tablet Take 20 mg by mouth daily      senna-docusate (SENOKOT-S/PERICOLACE) 8.6-50 MG tablet Take 1 tablet by mouth 2 times daily +BID PRN      traZODone (DESYREL) 100 MG tablet Take 100 mg by mouth at bedtime       Objective: BP 97/48   Pulse 70   Temp 97.7  F (36.5  C)   Resp 20   SpO2 95%   Exam:  GENERAL APPEARANCE: Alert, in no distress, cooperative.   RESP: Respiratory effort good, no respiratory distress, Lung sounds clear. On RA.   CV: Auscultation of heart reveals S1, S2, rate and rhythm regular, no murmur, no rub or gallop, Edema 2+ BLE. Peripheral pulses are  2+.  Skin: Incision is as noted here w/ new erythema and drainage:    PSYCH: Insight, judgement, and memory are forgetful at baseline, affect and mood are happy/engaged.    ASSESSMENT/PLAN:  Status post total replacement of left hip  Periprosthetic fracture of femur following total replacement of hip, subsequent encounter  Orthopedic aftercare  Acute blood loss as cause of postoperative anemia  Rheumatoid arthritis involving multiple sites, unspecified whether rheumatoid factor present (H)  Primary osteoarthritis involving multiple joints  Acute on chronic. Tenuous.  Provider reviewed records from facility, and interpreted most recent imaging/lab work (such as Hgb), and vital signs, each with their own characteristics requiring MDM.  Provider discussed care with nursing, rehabilitation team, and :  Lana Victor felt that her incision was rubbing against the wheelchair and that she needs a bigger wheelchair.  Rehab team to work on this.  Overall, her progress is slow secondary to deconditioning and weakness.  Provider discussed potential start of antibiotics for cellulitis on left hip incision, but given patient is not febrile and feels that this is irritation from rubbing on the wheelchair, she is competent that this can wait until she follows up with orthopedics on 7/10.  Provider then discussed care with nursing, and recommended that they alert orthopedics about this incision change.   states likely discharge back to [facility] community with services when patient is ready.  Hemoglobin improved from 7.0-7.4 today.  Continue FeSO4, and monitor for signs/symptoms of bleeding.  It is likely this is just postoperative anemia.  May consider discontinuation of FeSO4 in the future.  If hemoglobin drops further, we did discuss hospitalization and need for transfusion.  Given incision changes, will ask for a full CBC to be added onto today's blood work.  If leukocytosis is present, would favor starting  antibiotics before orthopedic appointment.  Noting that ibuprofen at higher dosing has been routinely used.  This can increase fluid retention which we are seeing.  Do not favor CHF exacerbation here, given there has not been extensive cardiac history, but notable that edema is present bilaterally.  Reduce ibuprofen dosing to hopefully reduce fluid retention.  Continue with daily weights.  Monitor for shortness of breath or respiratory compromise.  Follow up w/in 1 week or as needed.    Orders:  Add on full CBC to today's blood work. Dx: redness to incision.  Decrease Ibuprofen to 400mg PO TID PRN. Dx: severe pain.     Electronically signed by:  Dr. Uyen Chahal, APRN CNP DNP

## 2024-07-10 ENCOUNTER — TELEPHONE (OUTPATIENT)
Dept: GERIATRICS | Facility: CLINIC | Age: 74
End: 2024-07-10
Payer: COMMERCIAL

## 2024-07-10 NOTE — TELEPHONE ENCOUNTER
11:45pm: Nursing called to report concern for infection at the surgical site of recent left hip.  Area is red, tender.  Patient did have low-grade temp to 99.3 however she sees orthopedics tomorrow afternoon, vital signs otherwise stable, no hypotension or tachycardia, chills or rigor.  Patient is stable.  Continue monitoring, may apply ice and update orthopedics to her appointment.    Kevin Parra, CNP

## 2024-07-16 VITALS
BODY MASS INDEX: 38.65 KG/M2 | HEART RATE: 68 BPM | OXYGEN SATURATION: 96 % | DIASTOLIC BLOOD PRESSURE: 70 MMHG | HEIGHT: 64 IN | SYSTOLIC BLOOD PRESSURE: 128 MMHG | RESPIRATION RATE: 14 BRPM | TEMPERATURE: 97.3 F | WEIGHT: 226.4 LBS

## 2024-07-16 NOTE — PROGRESS NOTES
"[facility] GERIATRICS    PRIMARY CARE PROVIDER AND CLINIC:  No primary care provider on file., No primary physician on file.  Chief Complaint   Patient presents with    Hospital F/U      [place] Medical Record Number:  [id number]  Place of Service where encounter took place:  [facility] ([facility]) [[id number]]    Lana Moyer  is a 73 year old  (1950), admitted to the above facility from  [facility] . Hospital stay 6/22/24 through 6/28/24. Patient was also admitted to [facility] from 7/12/24 through 7/16/24.  HPI:    As per chart:\"Ms. Lana Moyer is a(n) 73 y.o. with a history of hypertension, SARAH, rheumatoid arthritis, CAD, recent hospitalization 6/22-6/28/24 for left hip fracture after a fall, s/p ORIF 6/25/24, who was found to have post op drainage and was admitted 07/12/24 for IRRIGATION DEBRIDEMENT LEFT HIP...\"  - wound cx grew proteus, ID recommends Ertapenem IV 1 gm x 6 weeks. PICC line placed. Weekly lab.   -ABLA, Hb reached esau 6.3 gm/dl, s/p 2 U PRBCs transfusion, Hb improved to 9.3 *7/16).       TODAY:  - Left hip prosthetic infection:  .  Patient reports pain is mild to moderate, better with medications.  She is going to see orthopedic today.      -Rehab:  .  Reports going on.  She is allowed to have tip-toe touch.   Appetite is fine.  Bowel movements she has when she stool, was constipated last week and is on laxative.  Denies any abdominal pain, nausea, vomiting, fever or chills  =======================================================================  CODE STATUS/ADVANCE DIRECTIVES DISCUSSION:  Full Code    ALLERGIES: No Known Allergies   PAST MEDICAL HISTORY: No past medical history on file.   PAST SURGICAL HISTORY:   has no past surgical history on file.  FAMILY HISTORY: family history includes Arthritis in her mother.  SOCIAL HISTORY:   reports that she has quit smoking. Her smoking use included cigarettes. She does not have any smokeless tobacco history on file.  Patient's " living condition: lives alone    Post Discharge Medication Reconciliation Status:   MED REC REQUIRED  Post Medication Reconciliation Status: discharge medications reconciled and changed, per note/orders       Current Outpatient Medications   Medication Sig Dispense Refill    acetaminophen (TYLENOL) 500 MG tablet Take 1,000 mg by mouth 2 times daily +1000mg PO Qday PRN      amoxicillin (AMOXIL) 500 MG capsule Take 2,000 mg by mouth once as needed (1 hour prior to dental appointment)      aspirin 81 MG EC tablet Take 81 mg by mouth 2 times daily (with meals)      atenolol (TENORMIN) 50 MG tablet Take 50 mg by mouth 2 times daily      Calcium Carb-Cholecalciferol (OS-MARLA CALCIUM + D3 PO) Take 1 tablet by mouth 2 times daily      co-enzyme Q-10 100 MG CAPS capsule Take 100 mg by mouth daily      DULoxetine HCl 60 MG CSDR Take 60 mg by mouth daily      ferrous sulfate (FE TABS) 325 (65 Fe) MG EC tablet Take 325 mg by mouth daily      folic acid (FOLVITE) 1 MG tablet Take 2 mg by mouth daily      furosemide (LASIX) 40 MG tablet Take 40 mg by mouth daily      Glucosamine-Chondroitin 166.7-133.3 MG CAPS Take 1 capsule by mouth 2 times daily      hydroxychloroquine (PLAQUENIL) 200 MG tablet Take 200 mg by mouth 2 times daily      ibuprofen (ADVIL/MOTRIN) 600 MG tablet Take 400 mg by mouth 3 times daily as needed for moderate pain      isosorbide dinitrate (ISORDIL) 20 MG tablet Take 20 mg by mouth 3 times daily      methocarbamol (ROBAXIN) 500 MG tablet Take 500 mg by mouth every 6 hours as needed for muscle spasms (1st line pain management)      methotrexate 1.25 MG half-tablet Take 20 mg by mouth once a week      nitroGLYcerin (NITROSTAT) 0.4 MG sublingual tablet Place 0.4 mg under the tongue every 5 minutes as needed for chest pain For chest pain place 1 tablet under the tongue every 5 minutes for 3 doses. If symptoms persist 5 minutes after 1st dose call 911.      oxyCODONE (OXY-IR) 5 MG capsule Take 1 capsule (5 mg) by  "mouth every 6 hours as needed for severe pain 15 capsule 0    potassium chloride ER (K-TAB) 20 MEQ CR tablet Take 20 mEq by mouth daily      rosuvastatin (CRESTOR) 20 MG tablet Take 20 mg by mouth daily      senna-docusate (SENOKOT-S/PERICOLACE) 8.6-50 MG tablet Take 1 tablet by mouth 2 times daily +BID PRN      traZODone (DESYREL) 100 MG tablet Take 100 mg by mouth at bedtime       No current facility-administered medications for this visit.       ROS:   10 point ROS of systems including Constitutional, Eyes, Respiratory, Cardiovascular, Gastroenterology, Genitourinary, Integumentary, Musculoskeletal, Psychiatric were all negative except for pertinent positives noted in my HPI.    Vitals:  /67   Pulse 71   Temp 97.9  F (36.6  C)   Resp 18   Ht 1.626 m (5' 4\")   Wt 95.6 kg (210 lb 11.2 oz)   SpO2 95%   BMI 36.17 kg/m    Exam:  GENERAL APPEARANCE:  in no distress,   RESP:  Unlabored breathing. CTA b/l.   CV:  S1S2 audible, regular HR, no murmur appreciated.   ABDOMEN:  soft, NT/ND, BS audible.   M/S:   no joint deformity noted on observation.   SKIN:  No rash noted on observation.PICC line in RUE.  No  NEURO:   No NFD appreciated on observation.   PSYCH:  affect and mood normal    Lab/Diagnostic data: Reviewed in the chart and EHR.      ASSESSMENT/PLAN:  -------------------------------  Infection of prosthetic joint, subsequent encounter  Status post total replacement of left hip  Periprosthetic fracture of femur following total replacement of hip, sequela  Blood loss anemia  Orthopedic aftercare  Rheumatoid arthritis involving multiple sites, unspecified whether rheumatoid factor present (H)  - Ertapenem daily. Lab weekly. Wound care. Orthopedic routine follow up  -analgesia optima with the current regimen  - started rehab program, making a progress, continue until desired goal is achieved.   - trend Hb/Hct. Clinically appears      Essential hypertension  Coronary artery disease involving autologous " artery coronary bypass graft without angina pectoris  -cardiac wise appears compensated  -  Pulse Readings from Last 4 Encounters:   07/18/24 71   07/16/24 68   07/15/24 80   07/14/24 80     BP Readings from Last 3 Encounters:   07/18/24 119/67   07/16/24 128/70   07/15/24 99/52   - The current medical regimen is effective;  continue present plan and medications.      Idiopathic peripheral neuropathy  - on cymbalat. No concern.     Orders:  NNO    Electronically signed by:  Juan Padilla MD

## 2024-07-17 ENCOUNTER — TRANSITIONAL CARE UNIT VISIT (OUTPATIENT)
Dept: GERIATRICS | Facility: CLINIC | Age: 74
End: 2024-07-17
Payer: COMMERCIAL

## 2024-07-17 DIAGNOSIS — Z96.649 PERIPROSTHETIC FRACTURE OF FEMUR FOLLOWING TOTAL REPLACEMENT OF HIP, SUBSEQUENT ENCOUNTER: ICD-10-CM

## 2024-07-17 DIAGNOSIS — M97.8XXD PERIPROSTHETIC FRACTURE OF FEMUR FOLLOWING TOTAL REPLACEMENT OF HIP, SUBSEQUENT ENCOUNTER: ICD-10-CM

## 2024-07-17 DIAGNOSIS — Z96.642 STATUS POST TOTAL REPLACEMENT OF LEFT HIP: Primary | ICD-10-CM

## 2024-07-17 DIAGNOSIS — M15.0 PRIMARY OSTEOARTHRITIS INVOLVING MULTIPLE JOINTS: ICD-10-CM

## 2024-07-17 DIAGNOSIS — T81.49XA INCISIONAL INFECTION: ICD-10-CM

## 2024-07-17 DIAGNOSIS — Z47.89 ORTHOPEDIC AFTERCARE: ICD-10-CM

## 2024-07-17 DIAGNOSIS — D62 ACUTE BLOOD LOSS AS CAUSE OF POSTOPERATIVE ANEMIA: ICD-10-CM

## 2024-07-17 DIAGNOSIS — R23.9 ALTERATION IN SKIN INTEGRITY RELATED TO SURGICAL INCISION: ICD-10-CM

## 2024-07-17 DIAGNOSIS — M06.9 RHEUMATOID ARTHRITIS INVOLVING MULTIPLE SITES, UNSPECIFIED WHETHER RHEUMATOID FACTOR PRESENT (H): ICD-10-CM

## 2024-07-17 PROCEDURE — 99310 SBSQ NF CARE HIGH MDM 45: CPT | Performed by: NURSE PRACTITIONER

## 2024-07-17 NOTE — LETTER
7/17/2024      Lana Moyer  [address]  [address]  [place] [address]        [facility] Hospital Return  PCP & CLINIC: No primary care provider on file., No primary physician on file.  Chief Complaint   Patient presents with     Hospital F/U    [place] MRN: [id number]. Place of Service where encounter took place:  [facility] ([facility]) [[id number]] Lana Moyer  is a 73 year old  (1950), admitted to the above facility from  [facility] . Hospital stay 7/12 through 7/16. Admitted to this facility for  rehab, medical management, and nursing care. HPI information obtained from: facility chart records, facility staff, patient report, [facility] chart review, and Care Everywhere [facility] chart review.     Brief Summary of Hospital Course: Lana Victor was transferred from [facility] to [facility] on 7/12 for a planned washout of her left hip s/p left STEPHANIE. It was found that her hip incison was infected and she was started on 8 wk course of IV abx + a topical wound vac for drainage management.     Updates since return to transitional care unit: Lana Victor returned to [facility] on 7/16 s/p the above hospitalization. Today, Lana Lance is feeling sad and anxious.  She says that she has never been so depressed or worried.  She wonders if she needs to change her medication because of this.  She becomes tearful and states that it so hard not to know what can happen with her left hip.  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  Despite this new change, she is not feeling feverish, and denies any new pain.  She further denies any new numbness or tingling anywhere, and does not think her edema has worsened.  She denies any new chest pain, shortness of breath, palpitations, headache or dizziness.  Her appetite is good.    CODE STATUS/ADVANCE DIRECTIVES DISCUSSION: CPR/Full code. Patient's living condition: lives alone in adult senior living complex. ALLERGIES: Patient  has no known allergies. PAST MEDICAL HISTORY:  has no past medical history on file.. PAST SURGICAL HISTORY:   has no past surgical history on file.. FAMILY HISTORY: family history includes Arthritis in her mother.. SOCIAL HISTORY:   reports that she has quit smoking. Her smoking use included cigarettes. She does not have any smokeless tobacco history on file.  Post Discharge Medication Reconciliation Status: discharge medications reconciled and changed, per note/orders.  Current Outpatient Medications   Medication Sig Dispense Refill     acetaminophen (TYLENOL) 500 MG tablet Take 1,000 mg by mouth 2 times daily +1000mg PO Qday PRN       amoxicillin (AMOXIL) 500 MG capsule Take 2,000 mg by mouth once as needed (1 hour prior to dental appointment)       aspirin 81 MG EC tablet Take 81 mg by mouth 2 times daily (with meals)       atenolol (TENORMIN) 50 MG tablet Take 50 mg by mouth 2 times daily       Calcium Carb-Cholecalciferol (OS-MARLA CALCIUM + D3 PO) Take 1 tablet by mouth 2 times daily       co-enzyme Q-10 100 MG CAPS capsule Take 100 mg by mouth daily       DULoxetine HCl 60 MG CSDR Take 60 mg by mouth daily       ferrous sulfate (FE TABS) 325 (65 Fe) MG EC tablet Take 325 mg by mouth daily       folic acid (FOLVITE) 1 MG tablet Take 2 mg by mouth daily       furosemide (LASIX) 40 MG tablet Take 40 mg by mouth daily       Glucosamine-Chondroitin 166.7-133.3 MG CAPS Take 1 capsule by mouth 2 times daily       hydroxychloroquine (PLAQUENIL) 200 MG tablet Take 200 mg by mouth 2 times daily       ibuprofen (ADVIL/MOTRIN) 600 MG tablet Take 400 mg by mouth 3 times daily as needed for moderate pain       isosorbide dinitrate (ISORDIL) 20 MG tablet Take 20 mg by mouth 3 times daily       methocarbamol (ROBAXIN) 500 MG tablet Take 500 mg by mouth every 6 hours as needed for muscle spasms (1st line pain management)       methotrexate 1.25 MG half-tablet Take 20 mg by mouth once a week       nitroGLYcerin (NITROSTAT) 0.4  "MG sublingual tablet Place 0.4 mg under the tongue every 5 minutes as needed for chest pain For chest pain place 1 tablet under the tongue every 5 minutes for 3 doses. If symptoms persist 5 minutes after 1st dose call 911.       oxyCODONE (OXY-IR) 5 MG capsule Take 1 capsule (5 mg) by mouth every 6 hours as needed for severe pain 15 capsule 0     potassium chloride ER (K-TAB) 20 MEQ CR tablet Take 20 mEq by mouth daily       rosuvastatin (CRESTOR) 20 MG tablet Take 20 mg by mouth daily       senna-docusate (SENOKOT-S/PERICOLACE) 8.6-50 MG tablet Take 1 tablet by mouth 2 times daily +BID PRN       traZODone (DESYREL) 100 MG tablet Take 100 mg by mouth at bedtime       ROS: 10 point ROS of systems including Constitutional, Eyes, Respiratory, Cardiovascular, Gastroenterology, Genitourinary, Integumentary, Musculoskeletal, Psychiatric were all negative except for pertinent positives noted in my HPI.  Vitals: /70   Pulse 68   Temp 97.3  F (36.3  C)   Resp 14   Ht 1.626 m (5' 4\")   Wt 102.7 kg (226 lb 6.4 oz)   SpO2 96%   BMI 38.86 kg/m    Exam:  GENERAL APPEARANCE: Alert, in no distress, cooperative.   ENT: Mouth/posterior oropharynx intact w/ moist mucous membranes, hearing acuity [place].  EYES: EOM, conjunctivae, lids, pupils and irises normal, PERRL2.   RESP: Respiratory effort good, no respiratory distress, Lung sounds clear. On RA.   CV: Auscultation of heart reveals S1, S2, rate and rhythm regular, no murmur, no rub or gallop, Edema 0+ BLE. Peripheral pulses are 2+.  ABDOMEN: Normal bowel sounds, soft, non-tender abdomen, and no masses palpated.  SKIN: Inspection/Palpation of skin and subcutaneous tissue baseline w/ fragility. No wounds/rashes noted except left hip incision which is covered w/ a serosanguinous saturated surgical dressing even w/ external/topical vac on and functioning properly.   NEURO: CN II-XII at patient's baseline, sensation baseline PPS.  PSYCH: Insight, judgement, and memory are " baseline forgetful, affect and mood are happy/engaged.    Lab/Diagnostic data: Recent labs in [facility] reviewed by me today.     ASSESSMENT/PLAN:  Status post total replacement of left hip  Incisional infection  Alteration in skin integrity related to surgical incision  Periprosthetic fracture of femur following total replacement of hip, subsequent encounter  Orthopedic aftercare  Acute blood loss as cause of postoperative anemia  Rheumatoid arthritis involving multiple sites, unspecified whether rheumatoid factor present (H)  Primary osteoarthritis involving multiple joints  Acute on chronic. Complex/Tenuous.   Provider reviewed records from hospitalization, facility, and interpreted most recent imaging/lab work (such as CBC, BMP), and vital signs, each with unique characteristics requiring MDM.   Significant drainage from left hip.  Known infection at this site with known IV antibiotics in place.  While labs are already scheduled for every Wednesday because of IV antibiotic dosing, these were not done today because patient just came back.  Will therefore spot check a CBC with differential on Friday 7/19 before the weekend to evaluate if infection has worsened.  As always, results should be shared with infectious disease.  Noting historical anxiety/depression which is usually managed with Cymbalta.  Patient likely feeling a lack of control during this acute change, and provider reassured that this is likely situational in nature.  Before medication changes are made, highly recommend counseling and time to see how she is feeling.  She was adamant that she was not suicidal, just scared about what the future holds.  Consult in-house , she is available immediately to help process some of these feelings.  Consult in-house psychologist who will be available within the week.  Discussion with  as noted below pertaining to this.  Continue Cymbalta as is, though do not feel this is a good treatment for  depression usually.  If depression does persist to clinical in nature, may consider a switch to a different SSRI.  Provider discussed care and management with nursing and :  Nursing working with orthopedics, and reporting copious amount of drainage despite the topical wound vacuum appliance that is in place.  Awaiting callback from orthopedics, but a follow-up appointment in person has been made for tomorrow 7/18.  Nursing also requesting a dose clarification on glucosamine/chondroitin.  Pharmacy does not have certain doses available of this, and dose adjustment may be needed.  Believe this can be held off for now:  Discontinue glucosamine/chondroitin.  May resume when Lana Victor improves.  Upon discussion with , provider inquired if a PHQ-9 has been repeated since readmission yesterday.  During Lana Victor's first admission almost 2 weeks ago and initial PHQ-9 was completed.  At that time her score was 0.    Given she has had a setback and is verbalizing depression, provider requesting repeat PHQ-9  Lana Lance reiterated her wish to be full code at this time, and CODE STATUS is already updated in epic from her previous admission.   Follow up w/in 1 week or as needed.    Orders:  SW to repeat PHQ9. Dx: depression/anxiety.    consult x1, routine. Dx: MDD.   ACP eval/tx x1, routine. Dx: MDD.   Discontinue Glucosamine/Chondroitin.  Recheck CBC x1 on 7/19. Dx: copious drainage from incision.     Electronically signed by:  Dr. Uyen Chahal, APRN CNP DNP                        Sincerely,        Uyen Chahal, JOHN CNP

## 2024-07-17 NOTE — PROGRESS NOTES
[facility] Return  PCP & CLINIC: No primary care provider on file., No primary physician on file.  Chief Complaint   Patient presents with    Hospital F/U    [place] MRN: [id number]. [facility] where [facility] took [facility] ([facility]) [[id number]] Lana Moyer  is a 73 year old  (1950), admitted to the above facility from  [facility] . Hospital stay 7/12 through 7/16. Admitted to this facility for  rehab, medical management, and nursing care. HPI information obtained from: facility chart records, facility staff, patient report, [facility] chart review, and Care Everywhere [facility] chart review.     Brief Summary of Hospital Course: Lana Victor was transferred from [facility] to [facility] on 7/12 for a planned washout of her left hip s/p left STEPHANIE. It was found that her hip incison was infected and she was started on 8 wk course of IV abx + a topical wound vac for drainage management.     Updates since return to transitional care unit: Lana Victor returned to [facility] on 7/16 s/p the above hospitalization. Today, Lana Lance is feeling sad and anxious.  She says that she has never been so depressed or worried.  She wonders if she needs to change her medication because of this.  She becomes tearful and states that it so hard not to know what can happen with her left hip.  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  Despite this new change, she is not feeling feverish, and denies any new pain.  She further denies any new numbness or tingling anywhere, and does not think her edema has worsened.  She denies any new chest pain, shortness of breath, palpitations, headache or dizziness.  Her appetite is good.    CODE STATUS/ADVANCE DIRECTIVES DISCUSSION: CPR/Full code. Patient's living condition: lives alone in adult senior living complex. ALLERGIES: Patient has no known allergies. PAST MEDICAL HISTORY:  has no past medical history on file.. PAST SURGICAL  HISTORY:   has no past surgical history on file.. FAMILY HISTORY: family history includes Arthritis in her mother.. SOCIAL HISTORY:   reports that she has quit smoking. Her smoking use included cigarettes. She does not have any smokeless tobacco history on file.  Post Discharge Medication Reconciliation Status: discharge medications reconciled and changed, per note/orders.  Current Outpatient Medications   Medication Sig Dispense Refill    acetaminophen (TYLENOL) 500 MG tablet Take 1,000 mg by mouth 2 times daily +1000mg PO Qday PRN      amoxicillin (AMOXIL) 500 MG capsule Take 2,000 mg by mouth once as needed (1 hour prior to dental appointment)      aspirin 81 MG EC tablet Take 81 mg by mouth 2 times daily (with meals)      atenolol (TENORMIN) 50 MG tablet Take 50 mg by mouth 2 times daily      Calcium Carb-Cholecalciferol (OS-MARLA CALCIUM + D3 PO) Take 1 tablet by mouth 2 times daily      co-enzyme Q-10 100 MG CAPS capsule Take 100 mg by mouth daily      DULoxetine HCl 60 MG CSDR Take 60 mg by mouth daily      ferrous sulfate (FE TABS) 325 (65 Fe) MG EC tablet Take 325 mg by mouth daily      folic acid (FOLVITE) 1 MG tablet Take 2 mg by mouth daily      furosemide (LASIX) 40 MG tablet Take 40 mg by mouth daily      Glucosamine-Chondroitin 166.7-133.3 MG CAPS Take 1 capsule by mouth 2 times daily      hydroxychloroquine (PLAQUENIL) 200 MG tablet Take 200 mg by mouth 2 times daily      ibuprofen (ADVIL/MOTRIN) 600 MG tablet Take 400 mg by mouth 3 times daily as needed for moderate pain      isosorbide dinitrate (ISORDIL) 20 MG tablet Take 20 mg by mouth 3 times daily      methocarbamol (ROBAXIN) 500 MG tablet Take 500 mg by mouth every 6 hours as needed for muscle spasms (1st line pain management)      methotrexate 1.25 MG half-tablet Take 20 mg by mouth once a week      nitroGLYcerin (NITROSTAT) 0.4 MG sublingual tablet Place 0.4 mg under the tongue every 5 minutes as needed for chest pain For chest pain place 1  "tablet under the tongue every 5 minutes for 3 doses. If symptoms persist 5 minutes after 1st dose call 911.      oxyCODONE (OXY-IR) 5 MG capsule Take 1 capsule (5 mg) by mouth every 6 hours as needed for severe pain 15 capsule 0    potassium chloride ER (K-TAB) 20 MEQ CR tablet Take 20 mEq by mouth daily      rosuvastatin (CRESTOR) 20 MG tablet Take 20 mg by mouth daily      senna-docusate (SENOKOT-S/PERICOLACE) 8.6-50 MG tablet Take 1 tablet by mouth 2 times daily +BID PRN      traZODone (DESYREL) 100 MG tablet Take 100 mg by mouth at bedtime       ROS: 10 point ROS of systems including Constitutional, Eyes, Respiratory, Cardiovascular, Gastroenterology, Genitourinary, Integumentary, Musculoskeletal, Psychiatric were all negative except for pertinent positives noted in my HPI.  Vitals: /70   Pulse 68   Temp 97.3  F (36.3  C)   Resp 14   Ht 1.626 m (5' 4\")   Wt 102.7 kg (226 lb 6.4 oz)   SpO2 96%   BMI 38.86 kg/m    Exam:  GENERAL APPEARANCE: Alert, in no distress, cooperative.   ENT: Mouth/posterior oropharynx intact w/ moist mucous membranes, hearing acuity [place].  EYES: EOM, conjunctivae, lids, pupils and irises normal, PERRL2.   RESP: Respiratory effort good, no respiratory distress, Lung sounds clear. On RA.   CV: Auscultation of heart reveals S1, S2, rate and rhythm regular, no murmur, no rub or gallop, Edema 0+ BLE. Peripheral pulses are 2+.  ABDOMEN: Normal bowel sounds, soft, non-tender abdomen, and no masses palpated.  SKIN: Inspection/Palpation of skin and subcutaneous tissue baseline w/ fragility. No wounds/rashes noted except left hip incision which is covered w/ a serosanguinous saturated surgical dressing even w/ external/topical vac on and functioning properly.   NEURO: CN II-XII at patient's baseline, sensation baseline PPS.  PSYCH: Insight, judgement, and memory are baseline forgetful, affect and mood are happy/engaged.    Lab/Diagnostic data: Recent labs in [facility] reviewed by me today. "     ASSESSMENT/PLAN:  Status post total replacement of left hip  Incisional infection  Alteration in skin integrity related to surgical incision  Periprosthetic fracture of femur following total replacement of hip, subsequent encounter  Orthopedic aftercare  Acute blood loss as cause of postoperative anemia  Rheumatoid arthritis involving multiple sites, unspecified whether rheumatoid factor present (H)  Primary osteoarthritis involving multiple joints  Acute on chronic. Complex/Tenuous.   Provider reviewed records from hospitalization, facility, and interpreted most recent imaging/lab work (such as CBC, BMP), and vital signs, each with unique characteristics requiring MDM.   Significant drainage from left hip.  Known infection at this site with known IV antibiotics in place.  While labs are already scheduled for every Wednesday because of IV antibiotic dosing, these were not done today because patient just came back.  Will therefore spot check a CBC with differential on Friday 7/19 before the weekend to evaluate if infection has worsened.  As always, results should be shared with infectious disease.  Noting historical anxiety/depression which is usually managed with Cymbalta.  Patient likely feeling a lack of control during this acute change, and provider reassured that this is likely situational in nature.  Before medication changes are made, highly recommend counseling and time to see how she is feeling.  She was adamant that she was not suicidal, just scared about what the future holds.  Consult in-house , she is available immediately to help process some of these feelings.  Consult in-house psychologist who will be available within the week.  Discussion with  as noted below pertaining to this.  Continue Cymbalta as is, though do not feel this is a good treatment for depression usually.  If depression does persist to clinical in nature, may consider a switch to a different SSRI.  Provider  discussed care and management with nursing and :  Nursing working with orthopedics, and reporting copious amount of drainage despite the topical wound vacuum appliance that is in place.  Awaiting callback from orthopedics, but a follow-up appointment in person has been made for tomorrow 7/18.  Nursing also requesting a dose clarification on glucosamine/chondroitin.  Pharmacy does not have certain doses available of this, and dose adjustment may be needed.  Believe this can be held off for now:  Discontinue glucosamine/chondroitin.  May resume when Lana Victor improves.  Upon discussion with , provider inquired if a PHQ-9 has been repeated since readmission yesterday.  During Lana Victor's first admission almost 2 weeks ago and initial PHQ-9 was completed.  At that time her score was 0.    Given she has had a setback and is verbalizing depression, provider requesting repeat PHQ-9  Lana Lance reiterated her wish to be full code at this time, and CODE STATUS is already updated in epic from her previous admission.   Follow up w/in 1 week or as needed.    Orders:  SW to repeat PHQ9. Dx: depression/anxiety.    consult x1, routine. Dx: MDD.   ACP eval/tx x1, routine. Dx: MDD.   Discontinue Glucosamine/Chondroitin.  Recheck CBC x1 on 7/19. Dx: copious drainage from incision.     Electronically signed by:  Dr. Uyen Chahal, APRN CNP DNP

## 2024-07-18 ENCOUNTER — TRANSITIONAL CARE UNIT VISIT (OUTPATIENT)
Dept: GERIATRICS | Facility: CLINIC | Age: 74
End: 2024-07-18
Payer: COMMERCIAL

## 2024-07-18 ENCOUNTER — LAB REQUISITION (OUTPATIENT)
Dept: LAB | Facility: CLINIC | Age: 74
End: 2024-07-18

## 2024-07-18 VITALS
SYSTOLIC BLOOD PRESSURE: 119 MMHG | BODY MASS INDEX: 35.97 KG/M2 | OXYGEN SATURATION: 95 % | HEIGHT: 64 IN | WEIGHT: 210.7 LBS | HEART RATE: 71 BPM | DIASTOLIC BLOOD PRESSURE: 67 MMHG | TEMPERATURE: 97.9 F | RESPIRATION RATE: 18 BRPM

## 2024-07-18 DIAGNOSIS — Z47.89 ORTHOPEDIC AFTERCARE: ICD-10-CM

## 2024-07-18 DIAGNOSIS — D64.9 ANEMIA, UNSPECIFIED: ICD-10-CM

## 2024-07-18 DIAGNOSIS — Z96.649 PERIPROSTHETIC FRACTURE OF FEMUR FOLLOWING TOTAL REPLACEMENT OF HIP, SEQUELA: ICD-10-CM

## 2024-07-18 DIAGNOSIS — D50.0 BLOOD LOSS ANEMIA: ICD-10-CM

## 2024-07-18 DIAGNOSIS — T84.50XD INFECTION OF PROSTHETIC JOINT, SUBSEQUENT ENCOUNTER: Primary | ICD-10-CM

## 2024-07-18 DIAGNOSIS — M97.8XXS PERIPROSTHETIC FRACTURE OF FEMUR FOLLOWING TOTAL REPLACEMENT OF HIP, SEQUELA: ICD-10-CM

## 2024-07-18 DIAGNOSIS — I25.810 CORONARY ARTERY DISEASE INVOLVING AUTOLOGOUS ARTERY CORONARY BYPASS GRAFT WITHOUT ANGINA PECTORIS: ICD-10-CM

## 2024-07-18 DIAGNOSIS — I10 ESSENTIAL HYPERTENSION: ICD-10-CM

## 2024-07-18 DIAGNOSIS — Z96.642 STATUS POST TOTAL REPLACEMENT OF LEFT HIP: ICD-10-CM

## 2024-07-18 DIAGNOSIS — G60.9 IDIOPATHIC PERIPHERAL NEUROPATHY: ICD-10-CM

## 2024-07-18 DIAGNOSIS — M06.9 RHEUMATOID ARTHRITIS INVOLVING MULTIPLE SITES, UNSPECIFIED WHETHER RHEUMATOID FACTOR PRESENT (H): ICD-10-CM

## 2024-07-18 PROCEDURE — 99305 1ST NF CARE MODERATE MDM 35: CPT | Performed by: FAMILY MEDICINE

## 2024-07-18 RX ORDER — OXYCODONE HYDROCHLORIDE 5 MG/1
5 CAPSULE ORAL EVERY 4 HOURS PRN
Status: SHIPPED
Start: 2024-07-18 | End: 2024-07-19

## 2024-07-18 RX ORDER — ERTAPENEM 1 G/1
1 INJECTION, POWDER, LYOPHILIZED, FOR SOLUTION INTRAMUSCULAR; INTRAVENOUS DAILY
COMMUNITY
End: 2024-08-24

## 2024-07-18 NOTE — LETTER
" 7/18/2024      Lana Moyer  [address]  [address]  [place] [address]        [facility] GERIATRICS    PRIMARY CARE PROVIDER AND CLINIC:  No primary care provider on file., No primary physician on file.  Chief Complaint   Patient presents with     Hospital F/U      [place] Medical Record Number:  [id number]  Place of Service where encounter took place:  [facility] [place] [facility]) [[id number]]    Lana Moyer  is a 73 year old  (1950), admitted to the above facility from  [facility] . Hospital stay 6/22/24 through 6/28/24. Patient was also admitted to [facility] from 7/12/24 through 7/16/24.  HPI:    As per chart:\"Ms. Lana Moyer is a(n) 73 y.o. with a history of hypertension, SARAH, rheumatoid arthritis, CAD, recent hospitalization 6/22-6/28/24 for left hip fracture after a fall, s/p ORIF 6/25/24, who was found to have post op drainage and was admitted 07/12/24 for IRRIGATION DEBRIDEMENT LEFT HIP...\"  - wound cx grew proteus, ID recommends Ertapenem IV 1 gm x 6 weeks. PICC line placed. Weekly lab.   -ABLA, Hb reached esau 6.3 gm/dl, s/p 2 U PRBCs transfusion, Hb improved to 9.3 *7/16).       TODAY:  - Left hip prosthetic infection:  .  Patient reports pain is mild to moderate, better with medications.  She is going to see orthopedic today.      -Rehab:  .  Reports going on.  She is allowed to have tip-toe touch.   Appetite is fine.  Bowel movements she has when she stool, was constipated last week and is on laxative.  Denies any abdominal pain, nausea, vomiting, fever or chills  =======================================================================  CODE STATUS/ADVANCE DIRECTIVES DISCUSSION:  Full Code    ALLERGIES: No Known Allergies   PAST MEDICAL HISTORY: No past medical history on file.   PAST SURGICAL HISTORY:   has no past surgical history on file.  FAMILY HISTORY: family history includes Arthritis in her mother.  SOCIAL HISTORY:   reports that she has quit smoking. Her " smoking use included cigarettes. She does not have any smokeless tobacco history on file.  Patient's living condition: lives alone    Post Discharge Medication Reconciliation Status:   MED REC REQUIRED  Post Medication Reconciliation Status: discharge medications reconciled and changed, per note/orders       Current Outpatient Medications   Medication Sig Dispense Refill     acetaminophen (TYLENOL) 500 MG tablet Take 1,000 mg by mouth 2 times daily +1000mg PO Qday PRN       amoxicillin (AMOXIL) 500 MG capsule Take 2,000 mg by mouth once as needed (1 hour prior to dental appointment)       aspirin 81 MG EC tablet Take 81 mg by mouth 2 times daily (with meals)       atenolol (TENORMIN) 50 MG tablet Take 50 mg by mouth 2 times daily       Calcium Carb-Cholecalciferol (OS-MARLA CALCIUM + D3 PO) Take 1 tablet by mouth 2 times daily       co-enzyme Q-10 100 MG CAPS capsule Take 100 mg by mouth daily       DULoxetine HCl 60 MG CSDR Take 60 mg by mouth daily       ferrous sulfate (FE TABS) 325 (65 Fe) MG EC tablet Take 325 mg by mouth daily       folic acid (FOLVITE) 1 MG tablet Take 2 mg by mouth daily       furosemide (LASIX) 40 MG tablet Take 40 mg by mouth daily       Glucosamine-Chondroitin 166.7-133.3 MG CAPS Take 1 capsule by mouth 2 times daily       hydroxychloroquine (PLAQUENIL) 200 MG tablet Take 200 mg by mouth 2 times daily       ibuprofen (ADVIL/MOTRIN) 600 MG tablet Take 400 mg by mouth 3 times daily as needed for moderate pain       isosorbide dinitrate (ISORDIL) 20 MG tablet Take 20 mg by mouth 3 times daily       methocarbamol (ROBAXIN) 500 MG tablet Take 500 mg by mouth every 6 hours as needed for muscle spasms (1st line pain management)       methotrexate 1.25 MG half-tablet Take 20 mg by mouth once a week       nitroGLYcerin (NITROSTAT) 0.4 MG sublingual tablet Place 0.4 mg under the tongue every 5 minutes as needed for chest pain For chest pain place 1 tablet under the tongue every 5 minutes for 3 doses.  "If symptoms persist 5 minutes after 1st dose call 911.       oxyCODONE (OXY-IR) 5 MG capsule Take 1 capsule (5 mg) by mouth every 6 hours as needed for severe pain 15 capsule 0     potassium chloride ER (K-TAB) 20 MEQ CR tablet Take 20 mEq by mouth daily       rosuvastatin (CRESTOR) 20 MG tablet Take 20 mg by mouth daily       senna-docusate (SENOKOT-S/PERICOLACE) 8.6-50 MG tablet Take 1 tablet by mouth 2 times daily +BID PRN       traZODone (DESYREL) 100 MG tablet Take 100 mg by mouth at bedtime       No current facility-administered medications for this visit.       ROS:   10 point ROS of systems including Constitutional, Eyes, Respiratory, Cardiovascular, Gastroenterology, Genitourinary, Integumentary, Musculoskeletal, Psychiatric were all negative except for pertinent positives noted in my HPI.    Vitals:  /67   Pulse 71   Temp 97.9  F (36.6  C)   Resp 18   Ht 1.626 m (5' 4\")   Wt 95.6 kg (210 lb 11.2 oz)   SpO2 95%   BMI 36.17 kg/m    Exam:  GENERAL APPEARANCE:  in no distress,   RESP:  Unlabored breathing. CTA b/l.   CV:  S1S2 audible, regular HR, no murmur appreciated.   ABDOMEN:  soft, NT/ND, BS audible.   M/S:   no joint deformity noted on observation.   SKIN:  No rash noted on observation.PICC line in RUE.  No  NEURO:   No NFD appreciated on observation.   PSYCH:  affect and mood normal    Lab/Diagnostic data: Reviewed in the chart and EHR.      ASSESSMENT/PLAN:  -------------------------------  Infection of prosthetic joint, subsequent encounter  Status post total replacement of left hip  Periprosthetic fracture of femur following total replacement of hip, sequela  Blood loss anemia  Orthopedic aftercare  Rheumatoid arthritis involving multiple sites, unspecified whether rheumatoid factor present (H)  - Ertapenem daily. Lab weekly. Wound care. Orthopedic routine follow up  -analgesia optima with the current regimen  - started rehab program, making a progress, continue until desired goal is " achieved.   - trend Hb/Hct. Clinically appears      Essential hypertension  Coronary artery disease involving autologous artery coronary bypass graft without angina pectoris  -cardiac wise appears compensated  -  Pulse Readings from Last 4 Encounters:   07/18/24 71   07/16/24 68   07/15/24 80   07/14/24 80     BP Readings from Last 3 Encounters:   07/18/24 119/67   07/16/24 128/70   07/15/24 99/52   - The current medical regimen is effective;  continue present plan and medications.      Idiopathic peripheral neuropathy  - on cymbalat. No concern.     Orders:  NNO    Electronically signed by:  Juan Padilla MD                     Sincerely,        Juan Padilla MD

## 2024-07-19 ENCOUNTER — TRANSITIONAL CARE UNIT VISIT (OUTPATIENT)
Dept: GERIATRICS | Facility: CLINIC | Age: 74
End: 2024-07-19
Payer: COMMERCIAL

## 2024-07-19 VITALS
HEIGHT: 64 IN | HEART RATE: 71 BPM | SYSTOLIC BLOOD PRESSURE: 119 MMHG | OXYGEN SATURATION: 95 % | BODY MASS INDEX: 37.05 KG/M2 | WEIGHT: 217 LBS | DIASTOLIC BLOOD PRESSURE: 67 MMHG | TEMPERATURE: 97.9 F | RESPIRATION RATE: 18 BRPM

## 2024-07-19 DIAGNOSIS — Z96.642 STATUS POST TOTAL REPLACEMENT OF LEFT HIP: ICD-10-CM

## 2024-07-19 DIAGNOSIS — Z96.649 PERIPROSTHETIC FRACTURE OF FEMUR FOLLOWING TOTAL REPLACEMENT OF HIP, SEQUELA: ICD-10-CM

## 2024-07-19 DIAGNOSIS — D50.0 BLOOD LOSS ANEMIA: ICD-10-CM

## 2024-07-19 DIAGNOSIS — M97.8XXS PERIPROSTHETIC FRACTURE OF FEMUR FOLLOWING TOTAL REPLACEMENT OF HIP, SEQUELA: ICD-10-CM

## 2024-07-19 DIAGNOSIS — Z47.89 ORTHOPEDIC AFTERCARE: ICD-10-CM

## 2024-07-19 DIAGNOSIS — M06.9 RHEUMATOID ARTHRITIS INVOLVING MULTIPLE SITES, UNSPECIFIED WHETHER RHEUMATOID FACTOR PRESENT (H): ICD-10-CM

## 2024-07-19 DIAGNOSIS — T84.50XD INFECTION OF PROSTHETIC JOINT, SUBSEQUENT ENCOUNTER: Primary | ICD-10-CM

## 2024-07-19 PROBLEM — G25.81 RESTLESS LEGS SYNDROME: Status: ACTIVE | Noted: 2024-06-23
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
## 2024-07-19 PROBLEM — C55: Status: ACTIVE | Noted: 2017-12-08

## 2024-07-19 PROCEDURE — 99309 SBSQ NF CARE MODERATE MDM 30: CPT | Performed by: NURSE PRACTITIONER

## 2024-07-19 RX ORDER — OXYCODONE HYDROCHLORIDE 5 MG/1
5 CAPSULE ORAL EVERY 6 HOURS PRN
Status: SHIPPED
Start: 2024-07-19 | End: 2024-08-15

## 2024-07-19 NOTE — LETTER
7/19/2024      Lana Moyer  [address]  [address]  [place] [address]        [facility] GERIATRIC SERVICE  Episodic/Acute/Follow-Up  [place] MRN: [id number]. Place of Service where encounter took place:  [facility] [place] ([facility]) [[id number]]   Chief Complaint   Patient presents with     Nursing Home Acute    HPI: Lana Moyer  is a 73 year old (1950), who is being seen today for an episodic care visit. Today's concern is:    Lana Victor seen today on routine follow up as she continues to rehab in TCU. During our last visit, we were concern for increased drainage to left hip incision and she saw ortho yesterday on 7/18.     Today, Lana Victor says her pain is pretty well-controlled, and she still does not have any new numbness or tingling.  She feels that the drainage has lessened from before, and her overall outlook is improved.  She thinks the other day she was just having a really bad day.  She knows that the plan is to monitor her incision over the weekend and update orthopedics on Monday.  She explains that she knows that she may have to return to the OR for another washout, but is feeling hopeful full because her incision is looking better than it was and draining less.  She continues to deny fever, denies shortness of breath or other new symptoms.    Past Medical and Surgical History reviewed in Epic today.  MEDICATIONS:  Current Outpatient Medications   Medication Sig Dispense Refill     oxyCODONE (OXY-IR) 5 MG capsule Take 1 capsule (5 mg) by mouth every 6 hours as needed for severe pain       acetaminophen (TYLENOL) 500 MG tablet Take 1,000 mg by mouth 3 times daily +650mg PO Qday PRN       amoxicillin (AMOXIL) 500 MG capsule Take 2,000 mg by mouth once as needed (1 hour prior to dental appointment)       aspirin 81 MG EC tablet Take 81 mg by mouth 2 times daily (with meals)       atenolol (TENORMIN) 50 MG tablet Take 50 mg by mouth 2 times daily       Calcium Carb-Cholecalciferol  "(OS-MARLA CALCIUM + D3 PO) Take 1 tablet by mouth 2 times daily       co-enzyme Q-10 100 MG CAPS capsule Take 100 mg by mouth daily       DULoxetine HCl 60 MG CSDR Take 60 mg by mouth daily       ertapenem (INVANZ) 1 G in sterile water for IV administration Inject 1 g into the vein daily       ferrous sulfate (FE TABS) 325 (65 Fe) MG EC tablet Take 325 mg by mouth daily       folic acid (FOLVITE) 1 MG tablet Take 2 mg by mouth daily       furosemide (LASIX) 40 MG tablet Take 40 mg by mouth daily       hydroxychloroquine (PLAQUENIL) 200 MG tablet Take 200 mg by mouth 2 times daily       ibuprofen (ADVIL/MOTRIN) 600 MG tablet Take 400 mg by mouth 3 times daily as needed for moderate pain       isosorbide dinitrate (ISORDIL) 20 MG tablet Take 20 mg by mouth 3 times daily       methocarbamol (ROBAXIN) 500 MG tablet Take 500 mg by mouth every 6 hours as needed for muscle spasms (1st line pain management)       methotrexate 1.25 MG half-tablet Take 20 mg by mouth once a week       nitroGLYcerin (NITROSTAT) 0.4 MG sublingual tablet Place 0.4 mg under the tongue every 5 minutes as needed for chest pain For chest pain place 1 tablet under the tongue every 5 minutes for 3 doses. If symptoms persist 5 minutes after 1st dose call 911.       potassium chloride ER (K-TAB) 20 MEQ CR tablet Take 20 mEq by mouth daily       rosuvastatin (CRESTOR) 20 MG tablet Take 20 mg by mouth daily       senna-docusate (SENOKOT-S/PERICOLACE) 8.6-50 MG tablet Take 1 tablet by mouth 2 times daily +BID PRN       traZODone (DESYREL) 100 MG tablet Take 100 mg by mouth at bedtime       Objective: /67   Pulse 71   Temp 97.9  F (36.6  C)   Resp 18   Ht 1.626 m (5' 4\")   Wt 98.4 kg (217 lb)   SpO2 95%   BMI 37.25 kg/m    Exam:  GENERAL APPEARANCE: Alert, in no distress, cooperative.   RESP: Respiratory effort good, no respiratory distress, On RA.   CV: Edema trace BLE. Peripheral pulses are 2+.  Skin: Incision pictured today:    PSYCH: Insight, " judgement, and memory are baseline forgetful, affect and mood are happy/engaged.    ASSESSMENT/PLAN:  Infection of prosthetic joint, subsequent encounter  Status post total replacement of left hip  Periprosthetic fracture of femur following total replacement of hip, sequela  Orthopedic aftercare  Rheumatoid arthritis involving multiple sites, unspecified whether rheumatoid factor present (H)  Blood loss anemia  Acute on chronic. Ongoing.  Provider reviewed records from facility, specialist(s) (ortho), and interpreted most recent imaging/lab work (such as CBC), and vital signs, each with their own characteristics requiring MDM.  Provider discussed care with nursing, wound care team, rehab team:  Nursing reporting that the wound is still draining quite a bit, and they are using ABD pads and monitoring ongoing.  Wound care team also tracking.  Orthopedics will be updated on Monday.  Rehab notes no new changes in her ability to rehab.  Pain is not inhibiting progress, and she is able to fully participate.  With amount of drainage described, and appearance of incision, suspect a wound VAC may be needed in the future.  Appreciate orthopedics assistance.  Of note, there is ongoing IV antibiotic dosing for this infection as well.  Pain is pretty well-controlled, and only 3 doses of oxycodone have been used all week.  Therefore:  Decrease all oxycodone as noted below.  Increase scheduled Tylenol as noted below.  Noting serial blood work is already ordered, but awaiting CBC results from today.  According to records this blood was drawn.  Will follow-up when results available.  Mental health is improved today, and Lana Victor does not feel like she needs further intervention here.  Follow up w/in 1 week or as needed.    Orders:  Increase scheduled Tylenol to 1000mg PO TID. Dx: left hip pain.  Change PRN Tylenol to 650mg PO Qday PRN. Dx: pain/fever.  Decrease all Oxycodone to 5mg PO Q6h PRN. Dx: severe pain.    Electronically  signed by:  Dr. Uyen Chahal, APRN CNP DNP      Sincerely,        Uyen Chahal, JOHN CNP

## 2024-07-19 NOTE — PROGRESS NOTES
[facility] GERIATRIC SERVICE  Episodic/Acute/Follow-Up  [place] MRN: [id number]. Place of Service where encounter took place:  [facility] (U) [[id number]]   Chief Complaint   Patient presents with    Nursing Home Acute    HPI: Lana Moyer  is a 73 year old (1950), who is being seen today for an episodic care visit. Today's concern is:    Lana Victor seen today on routine follow up as she continues to rehab in TCU. During our last visit, we were concern for increased drainage to left hip incision and she saw ortho yesterday on 7/18.     Today, Lana Victor says her pain is pretty well-controlled, and she still does not have any new numbness or tingling.  She feels that the drainage has lessened from before, and her overall outlook is improved.  She thinks the other day she was just having a really bad day.  She knows that the plan is to monitor her incision over the weekend and update orthopedics on Monday.  She explains that she knows that she may have to return to the OR for another washout, but is feeling hopeful full because her incision is looking better than it was and draining less.  She continues to deny fever, denies shortness of breath or other new symptoms.    Past Medical and Surgical History reviewed in Epic today.  MEDICATIONS:  Current Outpatient Medications   Medication Sig Dispense Refill    oxyCODONE (OXY-IR) 5 MG capsule Take 1 capsule (5 mg) by mouth every 6 hours as needed for severe pain      acetaminophen (TYLENOL) 500 MG tablet Take 1,000 mg by mouth 3 times daily +650mg PO Qday PRN      amoxicillin (AMOXIL) 500 MG capsule Take 2,000 mg by mouth once as needed (1 hour prior to dental appointment)      aspirin 81 MG EC tablet Take 81 mg by mouth 2 times daily (with meals)      atenolol (TENORMIN) 50 MG tablet Take 50 mg by mouth 2 times daily      Calcium Carb-Cholecalciferol (OS-MARLA CALCIUM + D3 PO) Take 1 tablet by mouth 2 times daily      co-enzyme Q-10 100 MG CAPS capsule Take  "100 mg by mouth daily      DULoxetine HCl 60 MG CSDR Take 60 mg by mouth daily      ertapenem (INVANZ) 1 G in sterile water for IV administration Inject 1 g into the vein daily      ferrous sulfate (FE TABS) 325 (65 Fe) MG EC tablet Take 325 mg by mouth daily      folic acid (FOLVITE) 1 MG tablet Take 2 mg by mouth daily      furosemide (LASIX) 40 MG tablet Take 40 mg by mouth daily      hydroxychloroquine (PLAQUENIL) 200 MG tablet Take 200 mg by mouth 2 times daily      ibuprofen (ADVIL/MOTRIN) 600 MG tablet Take 400 mg by mouth 3 times daily as needed for moderate pain      isosorbide dinitrate (ISORDIL) 20 MG tablet Take 20 mg by mouth 3 times daily      methocarbamol (ROBAXIN) 500 MG tablet Take 500 mg by mouth every 6 hours as needed for muscle spasms (1st line pain management)      methotrexate 1.25 MG half-tablet Take 20 mg by mouth once a week      nitroGLYcerin (NITROSTAT) 0.4 MG sublingual tablet Place 0.4 mg under the tongue every 5 minutes as needed for chest pain For chest pain place 1 tablet under the tongue every 5 minutes for 3 doses. If symptoms persist 5 minutes after 1st dose call 911.      potassium chloride ER (K-TAB) 20 MEQ CR tablet Take 20 mEq by mouth daily      rosuvastatin (CRESTOR) 20 MG tablet Take 20 mg by mouth daily      senna-docusate (SENOKOT-S/PERICOLACE) 8.6-50 MG tablet Take 1 tablet by mouth 2 times daily +BID PRN      traZODone (DESYREL) 100 MG tablet Take 100 mg by mouth at bedtime       Objective: /67   Pulse 71   Temp 97.9  F (36.6  C)   Resp 18   Ht 1.626 m (5' 4\")   Wt 98.4 kg (217 lb)   SpO2 95%   BMI 37.25 kg/m    Exam:  GENERAL APPEARANCE: Alert, in no distress, cooperative.   RESP: Respiratory effort good, no respiratory distress, On RA.   CV: Edema trace BLE. Peripheral pulses are 2+.  Skin: Incision pictured today:    PSYCH: Insight, judgement, and memory are baseline forgetful, affect and mood are happy/engaged.    ASSESSMENT/PLAN:  Infection of " prosthetic joint, subsequent encounter  Status post total replacement of left hip  Periprosthetic fracture of femur following total replacement of hip, sequela  Orthopedic aftercare  Rheumatoid arthritis involving multiple sites, unspecified whether rheumatoid factor present (H)  Blood loss anemia  Acute on chronic. Ongoing.  Provider reviewed records from facility, specialist(s) (ortho), and interpreted most recent imaging/lab work (such as CBC), and vital signs, each with their own characteristics requiring MDM.  Provider discussed care with nursing, wound care team, rehab team:  Nursing reporting that the wound is still draining quite a bit, and they are using ABD pads and monitoring ongoing.  Wound care team also tracking.  Orthopedics will be updated on Monday.  Rehab notes no new changes in her ability to rehab.  Pain is not inhibiting progress, and she is able to fully participate.  With amount of drainage described, and appearance of incision, suspect a wound VAC may be needed in the future.  Appreciate orthopedics assistance.  Of note, there is ongoing IV antibiotic dosing for this infection as well.  Pain is pretty well-controlled, and only 3 doses of oxycodone have been used all week.  Therefore:  Decrease all oxycodone as noted below.  Increase scheduled Tylenol as noted below.  Noting serial blood work is already ordered, but awaiting CBC results from today.  According to records this blood was drawn.  Will follow-up when results available.  Mental health is improved today, and Lana Victor does not feel like she needs further intervention here.  Follow up w/in 1 week or as needed.    Orders:  Increase scheduled Tylenol to 1000mg PO TID. Dx: left hip pain.  Change PRN Tylenol to 650mg PO Qday PRN. Dx: pain/fever.  Decrease all Oxycodone to 5mg PO Q6h PRN. Dx: severe pain.    Electronically signed by:  Dr. Uyen Chahal, APRN CNP DNP

## 2024-07-22 ENCOUNTER — TELEPHONE (OUTPATIENT)
Dept: GERIATRICS | Facility: CLINIC | Age: 74
End: 2024-07-22
Payer: COMMERCIAL

## 2024-07-22 LAB
ERYTHROCYTE [DISTWIDTH] IN BLOOD BY AUTOMATED COUNT: 15 % (ref 10–15)
HCT VFR BLD AUTO: 32.5 % (ref 35–47)
HGB BLD-MCNC: 10 G/DL (ref 11.7–15.7)
MCH RBC QN AUTO: 29.3 PG (ref 26.5–33)
MCHC RBC AUTO-ENTMCNC: 30.8 G/DL (ref 31.5–36.5)
MCV RBC AUTO: 95 FL (ref 78–100)
PLATELET # BLD AUTO: 223 10E3/UL (ref 150–450)
RBC # BLD AUTO: 3.41 10E6/UL (ref 3.8–5.2)
WBC # BLD AUTO: 7.6 10E3/UL (ref 4–11)

## 2024-07-22 PROCEDURE — P9604 ONE-WAY ALLOW PRORATED TRIP: HCPCS | Performed by: FAMILY MEDICINE

## 2024-07-22 PROCEDURE — 85027 COMPLETE CBC AUTOMATED: CPT | Performed by: FAMILY MEDICINE

## 2024-07-22 NOTE — TELEPHONE ENCOUNTER
[facility] Triage Nurse Telephone Encounter    Provider: JOHN Fletcher CNP, DNP  Facility: [facility] Facility Type:  TCU    Caller: Loretta  Call Back Number: [phone]    Allergies:  No Known Allergies     Reason for call:   Patient missed the dose of ertapenem yesterday. Nursing is wondering if they need to add on another dose of the ertapenem to the end of the treatment as she ends on 8/24. Nursing is wanting to give the ertapenem now and start scheduling it on the day shift as she was getting in the evening but missed her dose last night due to pharmacy not sending it out.     Verbal Order/Direction given by Provider:   Okay to start the ertapenem daily during dayshift starting today due to missed dose last night.  Add the missed dose onto the end of the treatment.    Provider giving Order:  Juan Padilla MD    Verbal Order given to: Loretta Erazo RN

## 2024-07-22 NOTE — PROGRESS NOTES
"[facility] GERIATRICS    Chief Complaint   Patient presents with    Nursing Home Acute     HPI:  Lana Moyer is a 73 year old  (1950), who is being seen today for an episodic care visit at: [facility] (U) [[id number]].     Today's concern is:    Left hip prosthetic infection:  Missed one dose of Ertapenem 7/21.    Did see Ortho on 7/23, and likely will end up with another wash, but no date yet. Denies fever, chills.  Appetite fine still tip toes.   - reports loose stool 2-3 per day since started abx. No watery stool.       -Rehab: - reports going fine.     ========================================  Allergies, and PMH/PSH reviewed in EPIC today.  REVIEW OF SYSTEMS:  4 point ROS including Respiratory, CV, GI and , other than that noted in the HPI,  is negative    Objective:   BP 93/54   Pulse 66   Temp 98.1  F (36.7  C)   Resp 16   Ht 1.626 m (5' 4\")   Wt 96.4 kg (212 lb 9.6 oz)   SpO2 97%   BMI 36.49 kg/m    GENERAL APPEARANCE:  in no distress,   RESP:  Unlabored breathing. CTA b/l.   CV:  S1S2 audible, regular HR, no murmur appreciated.   ABDOMEN:  soft, NT/ND, BS audible.   M/S:   no joint deformity noted on observation.   SKIN:  No rash noted on observation.PICC line in RUE.  No  NEURO:   No NFD appreciated on observation.   PSYCH:  affect and mood normal      Labs done in SNF are in [facility]. Please refer to them using [facility]/Care Everywhere.    Assessment/Plan:  ----------------------  Infection of prosthetic joint, subsequent encounter  Status post total replacement of left hip  Periprosthetic fracture of femur following total replacement of hip, sequela  Blood loss anemia  Orthopedic aftercare  Rheumatoid arthritis involving multiple sites, unspecified whether rheumatoid factor present (H)  - Ertapenem daily. Lab weekly. Wound care. Orthopedic routine follow up  -analgesia optima with the current regimen  - started rehab program, making a progress, continue until desired goal is " achieved.   - trend Hb/Hct. Clinically appears  - will have another wound wash, date to be determined by the ortho team.         Essential hypertension  Coronary artery disease involving autologous artery coronary bypass graft without angina pectoris  -cardiac wise appears compensate  - The current medical regimen is effective;  continue present plan and medications.        Idiopathic peripheral neuropathy  - on Cymbalta. No concern.     MED REC REQUIRED  Post Medication Reconciliation Status: medication reconcilation previously completed during another office visit      Orders:  NNO    Electronically signed by: Juan Padilla MD

## 2024-07-23 ENCOUNTER — TRANSITIONAL CARE UNIT VISIT (OUTPATIENT)
Dept: GERIATRICS | Facility: CLINIC | Age: 74
End: 2024-07-23
Payer: COMMERCIAL

## 2024-07-23 VITALS
RESPIRATION RATE: 16 BRPM | OXYGEN SATURATION: 97 % | DIASTOLIC BLOOD PRESSURE: 54 MMHG | TEMPERATURE: 98.1 F | WEIGHT: 212.6 LBS | HEIGHT: 64 IN | HEART RATE: 66 BPM | SYSTOLIC BLOOD PRESSURE: 93 MMHG | BODY MASS INDEX: 36.29 KG/M2

## 2024-07-23 DIAGNOSIS — G60.9 IDIOPATHIC PERIPHERAL NEUROPATHY: ICD-10-CM

## 2024-07-23 DIAGNOSIS — I25.810 CORONARY ARTERY DISEASE INVOLVING AUTOLOGOUS ARTERY CORONARY BYPASS GRAFT WITHOUT ANGINA PECTORIS: ICD-10-CM

## 2024-07-23 DIAGNOSIS — Z47.89 ORTHOPEDIC AFTERCARE: ICD-10-CM

## 2024-07-23 DIAGNOSIS — Z96.649 PERIPROSTHETIC FRACTURE OF FEMUR FOLLOWING TOTAL REPLACEMENT OF HIP, SEQUELA: ICD-10-CM

## 2024-07-23 DIAGNOSIS — M97.8XXS PERIPROSTHETIC FRACTURE OF FEMUR FOLLOWING TOTAL REPLACEMENT OF HIP, SEQUELA: ICD-10-CM

## 2024-07-23 DIAGNOSIS — I10 ESSENTIAL HYPERTENSION: ICD-10-CM

## 2024-07-23 DIAGNOSIS — Z96.642 STATUS POST TOTAL REPLACEMENT OF LEFT HIP: ICD-10-CM

## 2024-07-23 DIAGNOSIS — M06.9 RHEUMATOID ARTHRITIS INVOLVING MULTIPLE SITES, UNSPECIFIED WHETHER RHEUMATOID FACTOR PRESENT (H): ICD-10-CM

## 2024-07-23 DIAGNOSIS — T84.50XD INFECTION OF PROSTHETIC JOINT, SUBSEQUENT ENCOUNTER: Primary | ICD-10-CM

## 2024-07-23 DIAGNOSIS — D50.0 BLOOD LOSS ANEMIA: ICD-10-CM

## 2024-07-23 PROCEDURE — 99309 SBSQ NF CARE MODERATE MDM 30: CPT | Performed by: FAMILY MEDICINE

## 2024-07-23 NOTE — LETTER
" 7/23/2024      Lana Moyer  [address]  [address]  [place] [address]        [facility] GERIATRICS    Chief Complaint   Patient presents with     Nursing Home Acute     HPI:  Lana Moyer is a 73 year old  (1950), who is being seen today for an episodic care visit at: [facility] (U) [[id number]].     Today's concern is:    Left hip prosthetic infection:  Missed one dose of Ertapenem 7/21.    Did see Ortho on 7/23, and likely will end up with another wash, but no date yet. Denies fever, chills.  Appetite fine still tip toes.       -Rehab:  - reports going fine.     ========================================  Allergies, and PMH/PSH reviewed in EPIC today.  REVIEW OF SYSTEMS:  4 point ROS including Respiratory, CV, GI and , other than that noted in the HPI,  is negative    Objective:   BP 93/54   Pulse 66   Temp 98.1  F (36.7  C)   Resp 16   Ht 1.626 m (5' 4\")   Wt 96.4 kg (212 lb 9.6 oz)   SpO2 97%   BMI 36.49 kg/m    GENERAL APPEARANCE:  in no distress,   RESP:  Unlabored breathing. CTA b/l.   CV:  S1S2 audible, regular HR, no murmur appreciated.   ABDOMEN:  soft, NT/ND, BS audible.   M/S:   no joint deformity noted on observation.   SKIN:  No rash noted on observation.PICC line in RUE.  No  NEURO:   No NFD appreciated on observation.   PSYCH:  affect and mood normal      Labs done in SNF are in [facility]. Please refer to them using [employer]/Care Everywhere.    Assessment/Plan:  ----------------------  Infection of prosthetic joint, subsequent encounter  Status post total replacement of left hip  Periprosthetic fracture of femur following total replacement of hip, sequela  Blood loss anemia  Orthopedic aftercare  Rheumatoid arthritis involving multiple sites, unspecified whether rheumatoid factor present (H)  - Ertapenem daily. Lab weekly. Wound care. Orthopedic routine follow up  -analgesia optima with the current regimen  - started rehab program, making a progress, " continue until desired goal is achieved.   - trend Hb/Hct. Clinically appears  - will have another wound wash, date to be determined by the ortho team.         Essential hypertension  Coronary artery disease involving autologous artery coronary bypass graft without angina pectoris  -cardiac wise appears compensate  - The current medical regimen is effective;  continue present plan and medications.        Idiopathic peripheral neuropathy  - on Cymbalta. No concern.     MED REC REQUIRED  Post Medication Reconciliation Status: medication reconcilation previously completed during another office visit      Orders:  NNO    Electronically signed by: Juan Padilla MD           Sincerely,        Juan Padilla MD

## 2024-08-05 ENCOUNTER — TRANSITIONAL CARE UNIT VISIT (OUTPATIENT)
Dept: GERIATRICS | Facility: CLINIC | Age: 74
End: 2024-08-05
Payer: COMMERCIAL

## 2024-08-05 VITALS
HEART RATE: 75 BPM | DIASTOLIC BLOOD PRESSURE: 83 MMHG | WEIGHT: 207.1 LBS | OXYGEN SATURATION: 96 % | TEMPERATURE: 97 F | SYSTOLIC BLOOD PRESSURE: 126 MMHG | BODY MASS INDEX: 35.55 KG/M2 | RESPIRATION RATE: 18 BRPM

## 2024-08-05 DIAGNOSIS — T81.49XA INCISIONAL INFECTION: ICD-10-CM

## 2024-08-05 DIAGNOSIS — Z96.649 PERIPROSTHETIC FRACTURE OF FEMUR FOLLOWING TOTAL REPLACEMENT OF HIP, SEQUELA: ICD-10-CM

## 2024-08-05 DIAGNOSIS — T84.50XD INFECTION OF PROSTHETIC JOINT, SUBSEQUENT ENCOUNTER: Primary | ICD-10-CM

## 2024-08-05 DIAGNOSIS — D50.0 BLOOD LOSS ANEMIA: ICD-10-CM

## 2024-08-05 DIAGNOSIS — M97.8XXS PERIPROSTHETIC FRACTURE OF FEMUR FOLLOWING TOTAL REPLACEMENT OF HIP, SEQUELA: ICD-10-CM

## 2024-08-05 DIAGNOSIS — Z98.890 STATUS POST HIP SURGERY: ICD-10-CM

## 2024-08-05 DIAGNOSIS — Z47.89 ORTHOPEDIC AFTERCARE: ICD-10-CM

## 2024-08-05 DIAGNOSIS — Z96.642 STATUS POST TOTAL REPLACEMENT OF LEFT HIP: ICD-10-CM

## 2024-08-05 DIAGNOSIS — M06.9 RHEUMATOID ARTHRITIS INVOLVING MULTIPLE SITES, UNSPECIFIED WHETHER RHEUMATOID FACTOR PRESENT (H): ICD-10-CM

## 2024-08-05 PROCEDURE — 99309 SBSQ NF CARE MODERATE MDM 30: CPT | Performed by: NURSE PRACTITIONER

## 2024-08-05 NOTE — PROGRESS NOTES
[facility] Hospital Return  PCP & CLINIC: Provider Outside, No address on file  Chief Complaint   Patient presents with    [place] MRN: [id number]. Place of Service where encounter took place:  [facility] ([facility]) [[id number]] Lana Moyer  is a 73 year old  (1950), admitted to the above facility from  [facility] . Hospital stay 7/26 through 8/2. Admitted to this facility for  rehab, medical management, and nursing care. HPI information obtained from: facility chart records, facility staff, patient report, [facility] chart review, and Care Everywhere [facility] chart review.     Brief Summary of Hospital Course: Lana Victor was transferred from [facility] to [facility] on 7/26 for a planned left hip washout.   She then had another washout w/ wound vac placement on 7/31. She continues on IV abx.     Updates since return to transitional care unit: Lana Victor returned to [facility] on 8/2 s/p the above hospitalization. Today, Lana Victor is feeling pretty good.  She still is having some mild to moderate pain around her left hip, it does not travel, and it is controlled well by the current regimen.  She does not think her swelling is any worse, but does note a little bit still remains.  She has compression socks.  Her bowels and bladder are working well.  She denies any nausea or heartburn.  Her appetite is good.  She denies any headaches, dizziness, shortness of breath, chest pain.  She has to follow-up with orthopedics within the next couple weeks.    CODE STATUS/ADVANCE DIRECTIVES DISCUSSION: CPR/Full code. Patient's living condition: lives alone. ALLERGIES: Patient has no known allergies. PAST MEDICAL HISTORY:  has no past medical history on file.. PAST SURGICAL HISTORY:   has no past surgical history on file.. FAMILY HISTORY: family history includes Arthritis in her mother.. SOCIAL HISTORY:   reports that she has quit smoking. Her smoking use included cigarettes. She does not have any smokeless tobacco history on  file.  Post Discharge Medication Reconciliation Status: discharge medications reconciled and changed, per note/orders.  Current Outpatient Medications   Medication Sig Dispense Refill    acetaminophen (TYLENOL) 500 MG tablet Take 1,000 mg by mouth 3 times daily +650mg PO Qday PRN      amoxicillin (AMOXIL) 500 MG capsule Take 2,000 mg by mouth once as needed (1 hour prior to dental appointment)      aspirin 81 MG EC tablet Take 81 mg by mouth 2 times daily (with meals)      atenolol (TENORMIN) 50 MG tablet Take 50 mg by mouth 2 times daily      Calcium Carb-Cholecalciferol (OS-MARLA CALCIUM + D3 PO) Take 1 tablet by mouth 2 times daily      co-enzyme Q-10 100 MG CAPS capsule Take 100 mg by mouth daily      DULoxetine HCl 60 MG CSDR Take 60 mg by mouth daily      ertapenem (INVANZ) 1 G in sterile water for IV administration Inject 1 g into the vein daily      ferrous sulfate (FE TABS) 325 (65 Fe) MG EC tablet Take 325 mg by mouth daily      folic acid (FOLVITE) 1 MG tablet Take 2 mg by mouth daily      furosemide (LASIX) 40 MG tablet Take 40 mg by mouth daily      hydroxychloroquine (PLAQUENIL) 200 MG tablet Take 200 mg by mouth 2 times daily      ibuprofen (ADVIL/MOTRIN) 600 MG tablet Take 400 mg by mouth 3 times daily as needed for moderate pain      isosorbide dinitrate (ISORDIL) 20 MG tablet Take 20 mg by mouth 3 times daily      methocarbamol (ROBAXIN) 500 MG tablet Take 500 mg by mouth every 6 hours as needed for muscle spasms (1st line pain management)      methotrexate 1.25 MG half-tablet Take 20 mg by mouth once a week      nitroGLYcerin (NITROSTAT) 0.4 MG sublingual tablet Place 0.4 mg under the tongue every 5 minutes as needed for chest pain For chest pain place 1 tablet under the tongue every 5 minutes for 3 doses. If symptoms persist 5 minutes after 1st dose call 911.      oxyCODONE (OXY-IR) 5 MG capsule Take 1 capsule (5 mg) by mouth every 6 hours as needed for severe pain      potassium chloride ER  (K-TAB) 20 MEQ CR tablet Take 20 mEq by mouth daily      rosuvastatin (CRESTOR) 20 MG tablet Take 20 mg by mouth daily      senna-docusate (SENOKOT-S/PERICOLACE) 8.6-50 MG tablet Take 1 tablet by mouth 2 times daily +BID PRN      traZODone (DESYREL) 100 MG tablet Take 100 mg by mouth at bedtime       ROS: 10 point ROS of systems including Constitutional, Eyes, Respiratory, Cardiovascular, Gastroenterology, Genitourinary, Integumentary, Musculoskeletal, Psychiatric were all negative except for pertinent positives noted in my HPI.  Vitals: /83   Pulse 75   Temp 97  F (36.1  C)   Resp 18   Wt 93.9 kg (207 lb 1.6 oz)   SpO2 96%   BMI 35.55 kg/m    Exam:  GENERAL APPEARANCE: Alert, in no distress, cooperative.   ENT: Mouth/posterior oropharynx intact w/ moist mucous membranes, hearing acuity [place].  EYES: EOM, conjunctivae, lids, pupils and irises normal, PERRL2.   RESP: Respiratory effort good, no respiratory distress, Lung sounds clear. On RA.   CV: Auscultation of heart reveals S1, S2, rate and rhythm regular, no murmur, no rub or gallop, Edema 1+ BLE. Peripheral pulses are 2+.  ABDOMEN: Normal bowel sounds, soft, non-tender abdomen, and no masses palpated.  SKIN: Inspection/Palpation of skin and subcutaneous tissue baseline w/ fragility. No wounds/rashes noted except left hip incision covered w/ wound vac.  NEURO: CN II-XII at patient's baseline, sensation baseline PPS.  PSYCH: Insight, judgement, and memory are baseline forgetful, affect and mood are happy/engaged.    Lab/Diagnostic data: Recent labs in [facility] reviewed by me today.     ASSESSMENT/PLAN:  Infection of prosthetic joint, subsequent encounter  Status post total replacement of left hip  Status post hip surgery  Periprosthetic fracture of femur following total replacement of hip, sequela  Incisional infection  Blood loss anemia  Orthopedic aftercare  Rheumatoid arthritis involving multiple sites, unspecified whether rheumatoid factor present  (H)  Acute on chronic. Complex/Tenuous.   Provider reviewed records from hospitalization, facility, and interpreted most recent imaging/lab work (such as CBC,  BMP, CRP), and vital signs, each with unique characteristics requiring MDM.   Provider discussed care and management with , rehab team, and nursing:  Nursing confirms follow-up appointment is yet to be made, IV antibiotics are to run through 8/24.  Will coordinate with infectious disease.  Rehab has picked back up, with minimal functional loss from recent surgery.   notes that discharge disposition/goal is still to return to senior living apartment where she does live independently without services.  Lana Victor is doing well.  Pain is controlled, bowels are moving, she is adjusting well emotionally.  Follow-up with orthopedics as recommended.  Continue current regimen and rehab.  Serial labs already ordered due to previous blood loss anemia, antibiotic dosing.   Wound VAC in place, with peripheral surveillance by wound care NP.  Lana Victor reconfirmed her CODE STATUS, which was previously discussed.  She will remain full code.  Follow up w/in 1 week or as needed.    Orders:  No new orders.    Electronically signed by:  Dr. Uyen Chahal, APRN CNP DNP

## 2024-08-05 NOTE — LETTER
8/5/2024      Lana Moyer  [address]  [address]  [facility] [address]        [facility] Return  PCP & CLINIC: Provider Outside, No address on file  Chief Complaint   Patient presents with     PEDRITO Hernandez MRN: [id number]. Place of Service where encounter took place:  [facility] ON [facility] ([facility]) [[id number]] Lana Moyer  is a 73 year old  (1950), admitted to the above facility from  [facility] . Hospital stay 7/26 through 8/2. Admitted to this facility for  rehab, medical management, and nursing care. HPI information obtained from: facility chart records, facility staff, patient report, [facility] chart review, and Care Everywhere [facility] chart review.     Brief Summary of Hospital Course: Lana Victor was transferred from [facility] to [facility] on 7/26 for a planned left hip washout.   She then had another washout w/ wound vac placement on 7/31. She continues on IV abx.     Updates since return to transitional care unit: Lana Victor returned to [facility] on 8/2 s/p the above hospitalization. Today, Lana Victor is feeling pretty good.  She still is having some mild to moderate pain around her left hip, it does not travel, and it is controlled well by the current regimen.  She does not think her swelling is any worse, but does note a little bit still remains.  She has compression socks.  Her bowels and bladder are working well.  She denies any nausea or heartburn.  Her appetite is good.  She denies any headaches, dizziness, shortness of breath, chest pain.  She has to follow-up with orthopedics within the next couple weeks.    CODE STATUS/ADVANCE DIRECTIVES DISCUSSION: CPR/Full code. Patient's living condition: lives alone. ALLERGIES: Patient has no known allergies. PAST MEDICAL HISTORY:  has no past medical history on file.. PAST SURGICAL HISTORY:   has no past surgical history on file.. FAMILY HISTORY: family history includes Arthritis in her mother.. SOCIAL HISTORY:   reports that she has quit  smoking. Her smoking use included cigarettes. She does not have any smokeless tobacco history on file.  Post Discharge Medication Reconciliation Status: discharge medications reconciled and changed, per note/orders.  Current Outpatient Medications   Medication Sig Dispense Refill     acetaminophen (TYLENOL) 500 MG tablet Take 1,000 mg by mouth 3 times daily +650mg PO Qday PRN       amoxicillin (AMOXIL) 500 MG capsule Take 2,000 mg by mouth once as needed (1 hour prior to dental appointment)       aspirin 81 MG EC tablet Take 81 mg by mouth 2 times daily (with meals)       atenolol (TENORMIN) 50 MG tablet Take 50 mg by mouth 2 times daily       Calcium Carb-Cholecalciferol (OS-MARLA CALCIUM + D3 PO) Take 1 tablet by mouth 2 times daily       co-enzyme Q-10 100 MG CAPS capsule Take 100 mg by mouth daily       DULoxetine HCl 60 MG CSDR Take 60 mg by mouth daily       ertapenem (INVANZ) 1 G in sterile water for IV administration Inject 1 g into the vein daily       ferrous sulfate (FE TABS) 325 (65 Fe) MG EC tablet Take 325 mg by mouth daily       folic acid (FOLVITE) 1 MG tablet Take 2 mg by mouth daily       furosemide (LASIX) 40 MG tablet Take 40 mg by mouth daily       hydroxychloroquine (PLAQUENIL) 200 MG tablet Take 200 mg by mouth 2 times daily       ibuprofen (ADVIL/MOTRIN) 600 MG tablet Take 400 mg by mouth 3 times daily as needed for moderate pain       isosorbide dinitrate (ISORDIL) 20 MG tablet Take 20 mg by mouth 3 times daily       methocarbamol (ROBAXIN) 500 MG tablet Take 500 mg by mouth every 6 hours as needed for muscle spasms (1st line pain management)       methotrexate 1.25 MG half-tablet Take 20 mg by mouth once a week       nitroGLYcerin (NITROSTAT) 0.4 MG sublingual tablet Place 0.4 mg under the tongue every 5 minutes as needed for chest pain For chest pain place 1 tablet under the tongue every 5 minutes for 3 doses. If symptoms persist 5 minutes after 1st dose call 911.       oxyCODONE (OXY-IR) 5  MG capsule Take 1 capsule (5 mg) by mouth every 6 hours as needed for severe pain       potassium chloride ER (K-TAB) 20 MEQ CR tablet Take 20 mEq by mouth daily       rosuvastatin (CRESTOR) 20 MG tablet Take 20 mg by mouth daily       senna-docusate (SENOKOT-S/PERICOLACE) 8.6-50 MG tablet Take 1 tablet by mouth 2 times daily +BID PRN       traZODone (DESYREL) 100 MG tablet Take 100 mg by mouth at bedtime       ROS: 10 point ROS of systems including Constitutional, Eyes, Respiratory, Cardiovascular, Gastroenterology, Genitourinary, Integumentary, Musculoskeletal, Psychiatric were all negative except for pertinent positives noted in my HPI.  Vitals: /83   Pulse 75   Temp 97  F (36.1  C)   Resp 18   Wt 93.9 kg (207 lb 1.6 oz)   SpO2 96%   BMI 35.55 kg/m    Exam:  GENERAL APPEARANCE: Alert, in no distress, cooperative.   ENT: Mouth/posterior oropharynx intact w/ moist mucous membranes, hearing acuity [place].  EYES: EOM, conjunctivae, lids, pupils and irises normal, PERRL2.   RESP: Respiratory effort good, no respiratory distress, Lung sounds clear. On RA.   CV: Auscultation of heart reveals S1, S2, rate and rhythm regular, no murmur, no rub or gallop, Edema 1+ BLE. Peripheral pulses are 2+.  ABDOMEN: Normal bowel sounds, soft, non-tender abdomen, and no masses palpated.  SKIN: Inspection/Palpation of skin and subcutaneous tissue baseline w/ fragility. No wounds/rashes noted except left hip incision covered w/ wound vac.  NEURO: CN II-XII at patient's baseline, sensation baseline PPS.  PSYCH: Insight, judgement, and memory are baseline forgetful, affect and mood are happy/engaged.    Lab/Diagnostic data: Recent labs in [facility] reviewed by me today.     ASSESSMENT/PLAN:  Infection of prosthetic joint, subsequent encounter  Status post total replacement of left hip  Status post hip surgery  Periprosthetic fracture of femur following total replacement of hip, sequela  Incisional infection  Blood loss  anemia  Orthopedic aftercare  Rheumatoid arthritis involving multiple sites, unspecified whether rheumatoid factor present (H)  Acute on chronic. Complex/Tenuous.   Provider reviewed records from hospitalization, facility, and interpreted most recent imaging/lab work (such as CBC,  BMP, CRP), and vital signs, each with unique characteristics requiring MDM.   Provider discussed care and management with , rehab team, and nursing:  Nursing confirms follow-up appointment is yet to be made, IV antibiotics are to run through 8/24.  Will coordinate with infectious disease.  Rehab has picked back up, with minimal functional loss from recent surgery.   notes that discharge disposition/goal is still to return to senior living apartment where she does live independently without services.  Lana Victor is doing well.  Pain is controlled, bowels are moving, she is adjusting well emotionally.  Follow-up with orthopedics as recommended.  Continue current regimen and rehab.  Serial labs already ordered due to previous blood loss anemia, antibiotic dosing.   Wound VAC in place, with peripheral surveillance by wound care NP.  Lana Victor reconfirmed her CODE STATUS, which was previously discussed.  She will remain full code.  Follow up w/in 1 week or as needed.    Orders:  No new orders.    Electronically signed by:  JOHN Willett CNP DNP                        Sincerely,        JOHN Moe CNP

## 2024-08-06 PROBLEM — I25.118 CORONARY ARTERY DISEASE INVOLVING NATIVE CORONARY ARTERY OF NATIVE HEART WITH OTHER FORM OF ANGINA PECTORIS (H): Status: ACTIVE | Noted: 2024-08-06

## 2024-08-07 PROBLEM — C55: Status: RESOLVED | Noted: 2017-12-08 | Resolved: 2024-08-07

## 2024-08-14 ENCOUNTER — TRANSITIONAL CARE UNIT VISIT (OUTPATIENT)
Dept: GERIATRICS | Facility: CLINIC | Age: 74
End: 2024-08-14
Payer: COMMERCIAL

## 2024-08-14 VITALS
OXYGEN SATURATION: 96 % | TEMPERATURE: 98.1 F | HEART RATE: 71 BPM | DIASTOLIC BLOOD PRESSURE: 57 MMHG | SYSTOLIC BLOOD PRESSURE: 110 MMHG | WEIGHT: 210 LBS | BODY MASS INDEX: 36.05 KG/M2 | RESPIRATION RATE: 18 BRPM

## 2024-08-14 DIAGNOSIS — T84.50XD INFECTION OF PROSTHETIC JOINT, SUBSEQUENT ENCOUNTER: Primary | ICD-10-CM

## 2024-08-14 DIAGNOSIS — Z98.890 STATUS POST HIP SURGERY: ICD-10-CM

## 2024-08-14 DIAGNOSIS — M97.8XXS PERIPROSTHETIC FRACTURE OF FEMUR FOLLOWING TOTAL REPLACEMENT OF HIP, SEQUELA: ICD-10-CM

## 2024-08-14 DIAGNOSIS — T81.49XA INCISIONAL INFECTION: ICD-10-CM

## 2024-08-14 DIAGNOSIS — Z96.642 STATUS POST TOTAL REPLACEMENT OF LEFT HIP: ICD-10-CM

## 2024-08-14 DIAGNOSIS — Z96.649 PERIPROSTHETIC FRACTURE OF FEMUR FOLLOWING TOTAL REPLACEMENT OF HIP, SEQUELA: ICD-10-CM

## 2024-08-14 DIAGNOSIS — I25.118 CORONARY ARTERY DISEASE INVOLVING NATIVE CORONARY ARTERY OF NATIVE HEART WITH OTHER FORM OF ANGINA PECTORIS (H): ICD-10-CM

## 2024-08-14 PROCEDURE — 99309 SBSQ NF CARE MODERATE MDM 30: CPT | Performed by: NURSE PRACTITIONER

## 2024-08-14 NOTE — PROGRESS NOTES
[facility] GERIATRIC SERVICE  Episodic/Acute/Follow-Up  [place] MRN: [id number]. Place of Service where encounter took place:  [facility] ([facility] [[id number]]   Chief Complaint   Patient presents with    RECHECK    HPI: Lana Moyer  is a 73 year old (1950), who is being seen today for an episodic care visit. Today's concern is:    Lana Victor seen today on routine follow up as she continues to rehab in TCU. During our last visit, Lana Victor got checked back in after having her surgery.  She finally had a wound VAC in the right antibiotics and was able to continue therapy.    Today, Lana Victor says things are going well.  She has almost no pain, and has not really been using her oxycodone much.  She denies numbness or tingling, and she thinks her swelling is getting better.  She does have compression in place and her legs are elevated right now.  She is returning to surgeon later this week, and hoping to get rid of her wound VAC.  She says her bowels and bladder are working well, she denies any shortness of breath, chest pain, palpitations.  She has had an occasional headache, but this is manageable.    Past Medical and Surgical History reviewed in Epic today.  MEDICATIONS:  Current Outpatient Medications   Medication Sig Dispense Refill    oxyCODONE (OXY-IR) 5 MG capsule Take 1 capsule (5 mg) by mouth 3 times daily as needed for severe pain      acetaminophen (TYLENOL) 500 MG tablet Take 1,000 mg by mouth 3 times daily +650mg PO Qday PRN      amoxicillin (AMOXIL) 500 MG capsule Take 2,000 mg by mouth once as needed (1 hour prior to dental appointment)      aspirin 81 MG EC tablet Take 81 mg by mouth 2 times daily (with meals)      atenolol (TENORMIN) 50 MG tablet Take 50 mg by mouth 2 times daily      Calcium Carb-Cholecalciferol (OS-MARLA CALCIUM + D3 PO) Take 1 tablet by mouth 2 times daily      co-enzyme Q-10 100 MG CAPS capsule Take 100 mg by mouth daily      DULoxetine HCl 60 MG CSDR Take 60 mg by mouth  daily      ertapenem (INVANZ) 1 G in sterile water for IV administration Inject 1 g into the vein daily      ferrous sulfate (FE TABS) 325 (65 Fe) MG EC tablet Take 325 mg by mouth daily      folic acid (FOLVITE) 1 MG tablet Take 2 mg by mouth daily      furosemide (LASIX) 40 MG tablet Take 40 mg by mouth daily      hydroxychloroquine (PLAQUENIL) 200 MG tablet Take 200 mg by mouth 2 times daily      ibuprofen (ADVIL/MOTRIN) 600 MG tablet Take 400 mg by mouth 3 times daily as needed for moderate pain      isosorbide dinitrate (ISORDIL) 20 MG tablet Take 20 mg by mouth 3 times daily      methocarbamol (ROBAXIN) 500 MG tablet Take 500 mg by mouth every 6 hours as needed for muscle spasms (1st line pain management)      methotrexate 1.25 MG half-tablet Take 20 mg by mouth once a week      nitroGLYcerin (NITROSTAT) 0.4 MG sublingual tablet Place 0.4 mg under the tongue every 5 minutes as needed for chest pain For chest pain place 1 tablet under the tongue every 5 minutes for 3 doses. If symptoms persist 5 minutes after 1st dose call 911.      potassium chloride ER (K-TAB) 20 MEQ CR tablet Take 20 mEq by mouth daily      rosuvastatin (CRESTOR) 20 MG tablet Take 20 mg by mouth daily      senna-docusate (SENOKOT-S/PERICOLACE) 8.6-50 MG tablet Take 1 tablet by mouth 2 times daily +BID PRN      traZODone (DESYREL) 100 MG tablet Take 100 mg by mouth at bedtime       Objective: /57   Pulse 71   Temp 98.1  F (36.7  C)   Resp 18   Wt 95.3 kg (210 lb)   SpO2 96%   BMI 36.05 kg/m    Exam:  GENERAL APPEARANCE: Alert, in no distress, cooperative.   RESP: Respiratory effort good, no respiratory distress, Lung sounds clear. On RA.   CV: Auscultation of heart reveals S1, S2, rate and rhythm regular, no murmur, no rub or gallop, Edema trace BLE. Peripheral pulses are 2+.  PSYCH: Insight, judgement, and memory are baseline, affect and mood are happy/engaged.    ASSESSMENT/PLAN:  Infection of prosthetic joint, subsequent  encounter  Status post total replacement of left hip  Status post hip surgery  Periprosthetic fracture of femur following total replacement of hip, sequela  Incisional infection  Coronary artery disease involving native coronary artery of native heart with other form of angina pectoris (H24)  Acute on chronic. Ongoing.  Provider reviewed records from facility, and interpreted most recent vital signs, each with their own characteristics requiring MDM.  Provider discussed care with  and rehab:   reports no new concerns or changes in discharge disposition.  Lana Victor is almost in a holding pattern because she needs to stay through her antibiotics.  Rehab team noting Lana Victor is weightbearing as tolerated and making some slow progress now that she is not having as much pain and feeling better.  Follow-up with surgeon as projected later this week.  Continue with antibiotics through 8/24.   Chart look back shows that serial labs were not completed last week, and it is unclear why this was missed.  Nursing has however scheduled labs later this week, will await these results with concern for anemia.  Pain is well-controlled adjuncts are available:  Reduce oxycodone as noted below.  Follow up w/in 1 week or as needed.    Orders:  Decrease Oxycodone to 5mg PO TID PRN. Dx: severe pain.    Electronically signed by:  Dr. Uyen Chahal, APRN CNP DNP

## 2024-08-14 NOTE — LETTER
8/14/2024      Lana Moyer  [address]  [address]  [facility] [place] [facility] [address]        [facility] GERIATRIC SERVICE  Episodic/Acute/Follow-Up  [place] MRN: [id number]. Place of Service where encounter took place:  [facility] ([facility]) [[id number]]   Chief Complaint   Patient presents with     RECHECK    HPI: Lana Moyer  is a 73 year old (1950), who is being seen today for an episodic care visit. Today's concern is:    Lana Victor seen today on routine follow up as she continues to rehab in TCU. During our last visit, Lana Victor got checked back in after having her surgery.  She finally had a wound VAC in the right antibiotics and was able to continue therapy.    Today, Lana Victor says things are going well.  She has almost no pain, and has not really been using her oxycodone much.  She denies numbness or tingling, and she thinks her swelling is getting better.  She does have compression in place and her legs are elevated right now.  She is returning to surgeon later this week, and hoping to get rid of her wound VAC.  She says her bowels and bladder are working well, she denies any shortness of breath, chest pain, palpitations.  She has had an occasional headache, but this is manageable.    Past Medical and Surgical History reviewed in Epic today.  MEDICATIONS:  Current Outpatient Medications   Medication Sig Dispense Refill     oxyCODONE (OXY-IR) 5 MG capsule Take 1 capsule (5 mg) by mouth 3 times daily as needed for severe pain       acetaminophen (TYLENOL) 500 MG tablet Take 1,000 mg by mouth 3 times daily +650mg PO Qday PRN       amoxicillin (AMOXIL) 500 MG capsule Take 2,000 mg by mouth once as needed (1 hour prior to dental appointment)       aspirin 81 MG EC tablet Take 81 mg by mouth 2 times daily (with meals)       atenolol (TENORMIN) 50 MG tablet Take 50 mg by mouth 2 times daily       Calcium Carb-Cholecalciferol (OS-MARLA CALCIUM + D3 PO) Take 1 tablet by mouth 2 times daily       co-enzyme  Q-10 100 MG CAPS capsule Take 100 mg by mouth daily       DULoxetine HCl 60 MG CSDR Take 60 mg by mouth daily       ertapenem (INVANZ) 1 G in sterile water for IV administration Inject 1 g into the vein daily       ferrous sulfate (FE TABS) 325 (65 Fe) MG EC tablet Take 325 mg by mouth daily       folic acid (FOLVITE) 1 MG tablet Take 2 mg by mouth daily       furosemide (LASIX) 40 MG tablet Take 40 mg by mouth daily       hydroxychloroquine (PLAQUENIL) 200 MG tablet Take 200 mg by mouth 2 times daily       ibuprofen (ADVIL/MOTRIN) 600 MG tablet Take 400 mg by mouth 3 times daily as needed for moderate pain       isosorbide dinitrate (ISORDIL) 20 MG tablet Take 20 mg by mouth 3 times daily       methocarbamol (ROBAXIN) 500 MG tablet Take 500 mg by mouth every 6 hours as needed for muscle spasms (1st line pain management)       methotrexate 1.25 MG half-tablet Take 20 mg by mouth once a week       nitroGLYcerin (NITROSTAT) 0.4 MG sublingual tablet Place 0.4 mg under the tongue every 5 minutes as needed for chest pain For chest pain place 1 tablet under the tongue every 5 minutes for 3 doses. If symptoms persist 5 minutes after 1st dose call 911.       potassium chloride ER (K-TAB) 20 MEQ CR tablet Take 20 mEq by mouth daily       rosuvastatin (CRESTOR) 20 MG tablet Take 20 mg by mouth daily       senna-docusate (SENOKOT-S/PERICOLACE) 8.6-50 MG tablet Take 1 tablet by mouth 2 times daily +BID PRN       traZODone (DESYREL) 100 MG tablet Take 100 mg by mouth at bedtime       Objective: /57   Pulse 71   Temp 98.1  F (36.7  C)   Resp 18   Wt 95.3 kg (210 lb)   SpO2 96%   BMI 36.05 kg/m    Exam:  GENERAL APPEARANCE: Alert, in no distress, cooperative.   RESP: Respiratory effort good, no respiratory distress, Lung sounds clear. On RA.   CV: Auscultation of heart reveals S1, S2, rate and rhythm regular, no murmur, no rub or gallop, Edema trace BLE. Peripheral pulses are 2+.  PSYCH: Insight, judgement, and memory  are baseline, affect and mood are happy/engaged.    ASSESSMENT/PLAN:  Infection of prosthetic joint, subsequent encounter  Status post total replacement of left hip  Status post hip surgery  Periprosthetic fracture of femur following total replacement of hip, sequela  Incisional infection  Coronary artery disease involving native coronary artery of native heart with other form of angina pectoris (H24)  Acute on chronic. Ongoing.  Provider reviewed records from facility, and interpreted most recent vital signs, each with their own characteristics requiring MDM.  Provider discussed care with  and rehab:   reports no new concerns or changes in discharge disposition.  Lana Victor is almost in a holding pattern because she needs to stay through her antibiotics.  Rehab team noting Lana Victor is weightbearing as tolerated and making some slow progress now that she is not having as much pain and feeling better.  Follow-up with surgeon as projected later this week.  Continue with antibiotics through 8/24.   Chart look back shows that serial labs were not completed last week, and it is unclear why this was missed.  Nursing has however scheduled labs later this week, will await these results with concern for anemia.  Pain is well-controlled adjuncts are available:  Reduce oxycodone as noted below.  Follow up w/in 1 week or as needed.    Orders:  Decrease Oxycodone to 5mg PO TID PRN. Dx: severe pain.    Electronically signed by:  JOHN Willett CNP DNP      Sincerely,        JOHN Moe CNP

## 2024-08-15 RX ORDER — OXYCODONE HYDROCHLORIDE 5 MG/1
5 CAPSULE ORAL 3 TIMES DAILY PRN
Status: SHIPPED
Start: 2024-08-15 | End: 2024-08-20

## 2024-08-19 ENCOUNTER — LAB REQUISITION (OUTPATIENT)
Dept: LAB | Facility: CLINIC | Age: 74
End: 2024-08-19

## 2024-08-19 DIAGNOSIS — T81.49XD INFECTION FOLLOWING A PROCEDURE, OTHER SURGICAL SITE, SUBSEQUENT ENCOUNTER: ICD-10-CM

## 2024-08-19 LAB
ALBUMIN SERPL BCG-MCNC: 3 G/DL (ref 3.5–5.2)
ALT SERPL W P-5'-P-CCNC: 15 U/L (ref 0–50)
AST SERPL W P-5'-P-CCNC: 18 U/L (ref 0–45)
BASOPHILS # BLD AUTO: 0.1 10E3/UL (ref 0–0.2)
BASOPHILS NFR BLD AUTO: 1 %
CREAT SERPL-MCNC: 0.72 MG/DL (ref 0.51–0.95)
EGFRCR SERPLBLD CKD-EPI 2021: 88 ML/MIN/1.73M2
EOSINOPHIL # BLD AUTO: 0.5 10E3/UL (ref 0–0.7)
EOSINOPHIL NFR BLD AUTO: 9 %
ERYTHROCYTE [DISTWIDTH] IN BLOOD BY AUTOMATED COUNT: 15.3 % (ref 10–15)
HCT VFR BLD AUTO: 29.7 % (ref 35–47)
HGB BLD-MCNC: 9.2 G/DL (ref 11.7–15.7)
HOLD SPECIMEN: NORMAL
IMM GRANULOCYTES # BLD: 0 10E3/UL
IMM GRANULOCYTES NFR BLD: 0 %
LYMPHOCYTES # BLD AUTO: 0.6 10E3/UL (ref 0.8–5.3)
LYMPHOCYTES NFR BLD AUTO: 12 %
MCH RBC QN AUTO: 28.4 PG (ref 26.5–33)
MCHC RBC AUTO-ENTMCNC: 31 G/DL (ref 31.5–36.5)
MCV RBC AUTO: 92 FL (ref 78–100)
MONOCYTES # BLD AUTO: 0.6 10E3/UL (ref 0–1.3)
MONOCYTES NFR BLD AUTO: 11 %
NEUTROPHILS # BLD AUTO: 3.4 10E3/UL (ref 1.6–8.3)
NEUTROPHILS NFR BLD AUTO: 66 %
NRBC # BLD AUTO: 0 10E3/UL
NRBC BLD AUTO-RTO: 0 /100
PLATELET # BLD AUTO: 230 10E3/UL (ref 150–450)
RBC # BLD AUTO: 3.24 10E6/UL (ref 3.8–5.2)
WBC # BLD AUTO: 5.2 10E3/UL (ref 4–11)

## 2024-08-19 PROCEDURE — 85025 COMPLETE CBC W/AUTO DIFF WBC: CPT | Performed by: NURSE PRACTITIONER

## 2024-08-19 PROCEDURE — 84450 TRANSFERASE (AST) (SGOT): CPT | Performed by: NURSE PRACTITIONER

## 2024-08-19 PROCEDURE — 84460 ALANINE AMINO (ALT) (SGPT): CPT | Performed by: NURSE PRACTITIONER

## 2024-08-19 PROCEDURE — 82565 ASSAY OF CREATININE: CPT | Performed by: NURSE PRACTITIONER

## 2024-08-19 PROCEDURE — 82040 ASSAY OF SERUM ALBUMIN: CPT | Performed by: NURSE PRACTITIONER

## 2024-08-20 ENCOUNTER — TRANSITIONAL CARE UNIT VISIT (OUTPATIENT)
Dept: GERIATRICS | Facility: CLINIC | Age: 74
End: 2024-08-20
Payer: COMMERCIAL

## 2024-08-20 VITALS
DIASTOLIC BLOOD PRESSURE: 60 MMHG | OXYGEN SATURATION: 95 % | TEMPERATURE: 98.2 F | SYSTOLIC BLOOD PRESSURE: 120 MMHG | RESPIRATION RATE: 18 BRPM | WEIGHT: 202 LBS | HEART RATE: 71 BPM | BODY MASS INDEX: 34.67 KG/M2

## 2024-08-20 DIAGNOSIS — T84.50XD INFECTION OF PROSTHETIC JOINT, SUBSEQUENT ENCOUNTER: Primary | ICD-10-CM

## 2024-08-20 DIAGNOSIS — Z96.649 PERIPROSTHETIC FRACTURE OF FEMUR FOLLOWING TOTAL REPLACEMENT OF HIP, SEQUELA: ICD-10-CM

## 2024-08-20 DIAGNOSIS — Z96.642 STATUS POST TOTAL REPLACEMENT OF LEFT HIP: ICD-10-CM

## 2024-08-20 DIAGNOSIS — Z46.89 ENCOUNTER FOR MANAGEMENT OF WOUND VAC: ICD-10-CM

## 2024-08-20 DIAGNOSIS — Z47.89 ORTHOPEDIC AFTERCARE: ICD-10-CM

## 2024-08-20 DIAGNOSIS — M97.8XXS PERIPROSTHETIC FRACTURE OF FEMUR FOLLOWING TOTAL REPLACEMENT OF HIP, SEQUELA: ICD-10-CM

## 2024-08-20 PROCEDURE — 99309 SBSQ NF CARE MODERATE MDM 30: CPT | Performed by: NURSE PRACTITIONER

## 2024-08-20 RX ORDER — OXYCODONE HYDROCHLORIDE 5 MG/1
5 CAPSULE ORAL 2 TIMES DAILY PRN
Status: SHIPPED
Start: 2024-08-20 | End: 2024-08-30

## 2024-08-20 NOTE — LETTER
8/20/2024      Lana Moyer  [address]  [address]  [place] [address]        [facility] GERIATRIC SERVICE  Episodic/Acute/Follow-Up  [place] MRN: [id number]. Place of Service where encounter took place:  [facility] ([facility]) [[id number]]   Chief Complaint   Patient presents with     RECHECK    HPI: Lana Moyer  is a 73 year old (1950), who is being seen today for an episodic care visit. Today's concern is:    Lana Victor seen today on routine follow up as she continues to rehab in TCU. During our last visit, we reduced oxycodone as patient was doing well, and she followed up with orthopedics later in the week.    Today, Lana Victor says that orthopedics feels she needs to continue her wound VAC, but she is following up with infectious disease in the next week and is hoping to get done with her IV antibiotics sooner than later.  She continues to remain grossly asymptomatic.  She denies fever, cough, headache, shortness of breath, chest pain, constipation.  She does not have any loose stools or stomach upset.  She denies any bladder symptoms.  She feels the draining from her wound VAC has significantly dwindled.  Therapy is going good.    Past Medical and Surgical History reviewed in Epic today.  MEDICATIONS:  Current Outpatient Medications   Medication Sig Dispense Refill     oxyCODONE (OXY-IR) 5 MG capsule Take 1 capsule (5 mg) by mouth 2 times daily as needed for severe pain       acetaminophen (TYLENOL) 500 MG tablet Take 1,000 mg by mouth 3 times daily +650mg PO Qday PRN       amoxicillin (AMOXIL) 500 MG capsule Take 2,000 mg by mouth once as needed (1 hour prior to dental appointment)       aspirin 81 MG EC tablet Take 81 mg by mouth 2 times daily (with meals)       atenolol (TENORMIN) 50 MG tablet Take 50 mg by mouth 2 times daily       Calcium Carb-Cholecalciferol (OS-MARLA CALCIUM + D3 PO) Take 1 tablet by mouth 2 times daily       co-enzyme Q-10 100 MG CAPS capsule Take 100 mg by mouth  daily       DULoxetine HCl 60 MG CSDR Take 60 mg by mouth daily       ertapenem (INVANZ) 1 G in sterile water for IV administration Inject 1 g into the vein daily       ferrous sulfate (FE TABS) 325 (65 Fe) MG EC tablet Take 325 mg by mouth daily       folic acid (FOLVITE) 1 MG tablet Take 2 mg by mouth daily       furosemide (LASIX) 40 MG tablet Take 40 mg by mouth daily       hydroxychloroquine (PLAQUENIL) 200 MG tablet Take 200 mg by mouth 2 times daily       ibuprofen (ADVIL/MOTRIN) 600 MG tablet Take 400 mg by mouth 3 times daily as needed for moderate pain       isosorbide dinitrate (ISORDIL) 20 MG tablet Take 20 mg by mouth 3 times daily       methocarbamol (ROBAXIN) 500 MG tablet Take 500 mg by mouth every 6 hours as needed for muscle spasms (1st line pain management)       methotrexate 1.25 MG half-tablet Take 20 mg by mouth once a week       nitroGLYcerin (NITROSTAT) 0.4 MG sublingual tablet Place 0.4 mg under the tongue every 5 minutes as needed for chest pain For chest pain place 1 tablet under the tongue every 5 minutes for 3 doses. If symptoms persist 5 minutes after 1st dose call 911.       potassium chloride ER (K-TAB) 20 MEQ CR tablet Take 20 mEq by mouth daily       rosuvastatin (CRESTOR) 20 MG tablet Take 20 mg by mouth daily       senna-docusate (SENOKOT-S/PERICOLACE) 8.6-50 MG tablet Take 1 tablet by mouth 2 times daily +BID PRN       traZODone (DESYREL) 100 MG tablet Take 100 mg by mouth at bedtime       Objective: /60   Pulse 71   Temp 98.2  F (36.8  C)   Resp 18   Wt 91.6 kg (202 lb)   SpO2 95%   BMI 34.67 kg/m    Exam:  GENERAL APPEARANCE: Alert, in no distress, cooperative.   RESP: Respiratory effort good, no respiratory distress, Lung sounds clear. On RA.   CV: Auscultation of heart reveals S1, S2, rate and rhythm regular, no murmur, no rub or gallop, Edema 1+ BLE. Peripheral pulses are 2+.  PSYCH: Insight, judgement, and memory are baseline, affect and mood are  happy/engaged.    ASSESSMENT/PLAN:  Infection of prosthetic joint, subsequent encounter  Status post total replacement of left hip  Periprosthetic fracture of femur following total replacement of hip, sequela  Orthopedic aftercare  Encounter for management of wound VAC  Acute on chronic. Ongoing.  Provider reviewed records from facility,  and interpreted most recent imaging/lab work (CBC, BMP), and vital signs, each with their own characteristics requiring MDM.  Provider discussed care with nursing, , and rehab team:  Therapy continues to be slow-moving, but patient had underlying mobility issues (RA etc) prior to surgery.  Wound vac and IV therapy will cover patient for skilled services through nursing if therapy cuts off.   Per , discharge plan is the same. Back to [facility].   Previous low calcium and other electrolyte disturbances, already getting serial labs but not a full BMP:  Add onto next lab set a full BMP.   Pain well controlled and minimal Oxycodone use.   Continue reducing Oxycodone.   Follow up w/in 1 week or as needed.    Orders:  Decrease Oxycodone to 5mg PO BID PRN. Dx: severe pain.  Add full BMP x1 on 8/23. Dx: hypocalcemia.     Electronically signed by:  Dr. Uyen Chahal, APRN CNP DNP        Sincerely,        Uyen Chahal, JOHN CNP

## 2024-08-20 NOTE — PROGRESS NOTES
[facility] GERIATRIC SERVICE  Episodic/Acute/Follow-Up  [place] MRN: [id number]. Place of Service where encounter took place:  [facility] ([facility]) [[id number]]   Chief Complaint   Patient presents with    RECHECK    HPI: Lana Moyer  is a 73 year old (1950), who is being seen today for an episodic care visit. Today's concern is:    Lana Victor seen today on routine follow up as she continues to rehab in TCU. During our last visit, we reduced oxycodone as patient was doing well, and she followed up with orthopedics later in the week.    Today, Lana Victor says that orthopedics feels she needs to continue her wound VAC, but she is following up with infectious disease in the next week and is hoping to get done with her IV antibiotics sooner than later.  She continues to remain grossly asymptomatic.  She denies fever, cough, headache, shortness of breath, chest pain, constipation.  She does not have any loose stools or stomach upset.  She denies any bladder symptoms.  She feels the draining from her wound VAC has significantly dwindled.  Therapy is going good.    Past Medical and Surgical History reviewed in Epic today.  MEDICATIONS:  Current Outpatient Medications   Medication Sig Dispense Refill    oxyCODONE (OXY-IR) 5 MG capsule Take 1 capsule (5 mg) by mouth 2 times daily as needed for severe pain      acetaminophen (TYLENOL) 500 MG tablet Take 1,000 mg by mouth 3 times daily +650mg PO Qday PRN      amoxicillin (AMOXIL) 500 MG capsule Take 2,000 mg by mouth once as needed (1 hour prior to dental appointment)      aspirin 81 MG EC tablet Take 81 mg by mouth 2 times daily (with meals)      atenolol (TENORMIN) 50 MG tablet Take 50 mg by mouth 2 times daily      Calcium Carb-Cholecalciferol (OS-MARLA CALCIUM + D3 PO) Take 1 tablet by mouth 2 times daily      co-enzyme Q-10 100 MG CAPS capsule Take 100 mg by mouth daily      DULoxetine HCl 60 MG CSDR Take 60 mg by mouth daily      ertapenem (INVANZ) 1 G in sterile  water for IV administration Inject 1 g into the vein daily      ferrous sulfate (FE TABS) 325 (65 Fe) MG EC tablet Take 325 mg by mouth daily      folic acid (FOLVITE) 1 MG tablet Take 2 mg by mouth daily      furosemide (LASIX) 40 MG tablet Take 40 mg by mouth daily      hydroxychloroquine (PLAQUENIL) 200 MG tablet Take 200 mg by mouth 2 times daily      ibuprofen (ADVIL/MOTRIN) 600 MG tablet Take 400 mg by mouth 3 times daily as needed for moderate pain      isosorbide dinitrate (ISORDIL) 20 MG tablet Take 20 mg by mouth 3 times daily      methocarbamol (ROBAXIN) 500 MG tablet Take 500 mg by mouth every 6 hours as needed for muscle spasms (1st line pain management)      methotrexate 1.25 MG half-tablet Take 20 mg by mouth once a week      nitroGLYcerin (NITROSTAT) 0.4 MG sublingual tablet Place 0.4 mg under the tongue every 5 minutes as needed for chest pain For chest pain place 1 tablet under the tongue every 5 minutes for 3 doses. If symptoms persist 5 minutes after 1st dose call 911.      potassium chloride ER (K-TAB) 20 MEQ CR tablet Take 20 mEq by mouth daily      rosuvastatin (CRESTOR) 20 MG tablet Take 20 mg by mouth daily      senna-docusate (SENOKOT-S/PERICOLACE) 8.6-50 MG tablet Take 1 tablet by mouth 2 times daily +BID PRN      traZODone (DESYREL) 100 MG tablet Take 100 mg by mouth at bedtime       Objective: /60   Pulse 71   Temp 98.2  F (36.8  C)   Resp 18   Wt 91.6 kg (202 lb)   SpO2 95%   BMI 34.67 kg/m    Exam:  GENERAL APPEARANCE: Alert, in no distress, cooperative.   RESP: Respiratory effort good, no respiratory distress, Lung sounds clear. On RA.   CV: Auscultation of heart reveals S1, S2, rate and rhythm regular, no murmur, no rub or gallop, Edema 1+ BLE. Peripheral pulses are 2+.  PSYCH: Insight, judgement, and memory are baseline, affect and mood are happy/engaged.    ASSESSMENT/PLAN:  Infection of prosthetic joint, subsequent encounter  Status post total replacement of left  hip  Periprosthetic fracture of femur following total replacement of hip, sequela  Orthopedic aftercare  Encounter for management of wound VAC  Acute on chronic. Ongoing.  Provider reviewed records from facility,  and interpreted most recent imaging/lab work (CBC, BMP), and vital signs, each with their own characteristics requiring MDM.  Provider discussed care with nursing, , and rehab team:  Therapy continues to be slow-moving, but patient had underlying mobility issues (RA etc) prior to surgery.  Wound vac and IV therapy will cover patient for skilled services through nursing if therapy cuts off.   Per , discharge plan is the same. Back to [facility].   Previous low calcium and other electrolyte disturbances, already getting serial labs but not a full BMP:  Add onto next lab set a full BMP.   Pain well controlled and minimal Oxycodone use.   Continue reducing Oxycodone.   Follow up w/in 1 week or as needed.    Orders:  Decrease Oxycodone to 5mg PO BID PRN. Dx: severe pain.  Add full BMP x1 on 8/23. Dx: hypocalcemia.     Electronically signed by:  Dr. Uyen Chahal, APRN CNP DNP

## 2024-08-22 ENCOUNTER — LAB REQUISITION (OUTPATIENT)
Dept: LAB | Facility: CLINIC | Age: 74
End: 2024-08-22

## 2024-08-22 DIAGNOSIS — E83.51 HYPOCALCEMIA: ICD-10-CM

## 2024-08-28 LAB
ANION GAP SERPL CALCULATED.3IONS-SCNC: 9 MMOL/L (ref 7–15)
BUN SERPL-MCNC: 10.8 MG/DL (ref 8–23)
CALCIUM SERPL-MCNC: 9.3 MG/DL (ref 8.8–10.4)
CHLORIDE SERPL-SCNC: 106 MMOL/L (ref 98–107)
CREAT SERPL-MCNC: 0.75 MG/DL (ref 0.51–0.95)
EGFRCR SERPLBLD CKD-EPI 2021: 84 ML/MIN/1.73M2
GLUCOSE SERPL-MCNC: 89 MG/DL (ref 70–99)
HCO3 SERPL-SCNC: 29 MMOL/L (ref 22–29)
POTASSIUM SERPL-SCNC: 3.6 MMOL/L (ref 3.4–5.3)
SODIUM SERPL-SCNC: 144 MMOL/L (ref 135–145)

## 2024-08-28 PROCEDURE — P9604 ONE-WAY ALLOW PRORATED TRIP: HCPCS | Performed by: NURSE PRACTITIONER

## 2024-08-28 PROCEDURE — 80048 BASIC METABOLIC PNL TOTAL CA: CPT | Performed by: NURSE PRACTITIONER

## 2024-08-30 ENCOUNTER — DISCHARGE SUMMARY NURSING HOME (OUTPATIENT)
Dept: GERIATRICS | Facility: CLINIC | Age: 74
End: 2024-08-30
Payer: COMMERCIAL

## 2024-08-30 VITALS
WEIGHT: 205.6 LBS | RESPIRATION RATE: 16 BRPM | OXYGEN SATURATION: 98 % | TEMPERATURE: 98.4 F | BODY MASS INDEX: 35.29 KG/M2 | SYSTOLIC BLOOD PRESSURE: 124 MMHG | DIASTOLIC BLOOD PRESSURE: 75 MMHG | HEART RATE: 84 BPM

## 2024-08-30 DIAGNOSIS — M62.81 MUSCLE WEAKNESS (GENERALIZED): ICD-10-CM

## 2024-08-30 DIAGNOSIS — T84.50XD INFECTION OF PROSTHETIC JOINT, SUBSEQUENT ENCOUNTER: Primary | ICD-10-CM

## 2024-08-30 DIAGNOSIS — Z47.89 ORTHOPEDIC AFTERCARE: ICD-10-CM

## 2024-08-30 DIAGNOSIS — I25.118 CORONARY ARTERY DISEASE INVOLVING NATIVE CORONARY ARTERY OF NATIVE HEART WITH OTHER FORM OF ANGINA PECTORIS (H): ICD-10-CM

## 2024-08-30 DIAGNOSIS — M06.9 RHEUMATOID ARTHRITIS INVOLVING MULTIPLE SITES, UNSPECIFIED WHETHER RHEUMATOID FACTOR PRESENT (H): ICD-10-CM

## 2024-08-30 DIAGNOSIS — M97.8XXS PERIPROSTHETIC FRACTURE OF FEMUR FOLLOWING TOTAL REPLACEMENT OF HIP, SEQUELA: ICD-10-CM

## 2024-08-30 DIAGNOSIS — Z46.89 ENCOUNTER FOR MANAGEMENT OF WOUND VAC: ICD-10-CM

## 2024-08-30 DIAGNOSIS — Z96.642 STATUS POST TOTAL REPLACEMENT OF LEFT HIP: ICD-10-CM

## 2024-08-30 DIAGNOSIS — Z96.649 PERIPROSTHETIC FRACTURE OF FEMUR FOLLOWING TOTAL REPLACEMENT OF HIP, SEQUELA: ICD-10-CM

## 2024-08-30 PROCEDURE — 99316 NF DSCHRG MGMT 30 MIN+: CPT | Performed by: NURSE PRACTITIONER

## 2024-08-30 RX ORDER — POLYETHYLENE GLYCOL 3350 17 G/17G
1 POWDER, FOR SOLUTION ORAL DAILY PRN
COMMUNITY

## 2024-08-30 RX ORDER — ACETAMINOPHEN 500 MG
500-1000 TABLET ORAL EVERY 6 HOURS PRN
COMMUNITY

## 2024-08-30 NOTE — PROGRESS NOTES
[facility] GERIATRICS DISCHARGE SUMMARY  PATIENT'S NAME: Lana Moyer  YOB: 1950  MEDICAL RECORD NUMBER:  [id number]  Place of Service where encounter took place:  [facility] (TCU) [[id number]]    PRIMARY CARE PROVIDER AND CLINIC RESPONSIBLE AFTER TRANSFER:   Provider Outside, No address on file    Non-FMG Provider     Transferring providers: Elvie Macdonald, APRN CNP,   Recent Hospitalization/ED:  [facility]  stay 7/26 to 8/2/24.  Date of SNF Admission:  8-3-24  Date of SNF (anticipated) Discharge:  potentially 8/31 or 9/1/24  Discharged to: previous senior apartment  Cognitive Scores: SLUMS: 26/30  Physical Function:  TTWB, requires A of 1 for transfers.  WC mobility  DME: Wheelchair and Walker    CODE STATUS/ADVANCE DIRECTIVES DISCUSSION:  Full Code   ALLERGIES: Patient has no known allergies.    NURSING FACILITY COURSE   Medication Changes/Rationale:   Oxycodone decreased to 5mg PO BID PRN and discontinued on 8/30 8/25-IV antibiotics discontinued and Augmentin started per ID for long term suppression, at least 3-6 months    Summary of nursing facility stay:   Admitted to TCU following surgical procedure to left hip.  Completed multiple wash outs of wound surgically.  Continues with wound vac to wound being changed 3x/weekly. TTWB to LLE.  Transfers A of 1.  Manual wc for main mobility in community    Infection of prosthetic joint, subsequent encounter  Periprosthetic fracture of femur following total replacement of hip, sequela  Status post total replacement of left hip  Orthopedic aftercare  Encounter for management of wound VAC  Muscle Weakness  Has made progress with therapies, but does not feel ready for discharge.  Is appealing.  Concerned about living alone, needing SBA. Daughter and/or sister to stay with her.     Home care referral  Wound vac remains in place, minimal output      -Pain managed with PRN Tylenol or Ibuprofen  -started Augmentin--continue long  term, follow with ID  -Follow with ortho 9/5  --Follow with ID 9/24  --Follow labs-post op anemia, long term antibiotics  --home care for ongoing therapy, home safety      Rheumatoid Arthritis  Complicates rehab, mobility  -Continues on Plaquenil, Methotrexate, Folic Acid      ASHD    --Continues with PTA ASA, Atenolol, Furosemide (and Kcl), Isosorbide, Rosuvastatin, has nitroglycerin available      Discharge Medications:  MED REC REQUIRED  Post Medication Reconciliation Status: meds reconciled       Current Outpatient Medications   Medication Sig Dispense Refill    acetaminophen (TYLENOL) 500 MG tablet Take 500-1,000 mg by mouth every 6 hours as needed for mild pain or pain.      amoxicillin-clavulanate (AUGMENTIN) 875-125 MG tablet Take 1 tablet by mouth 2 times daily.      polyethylene glycol (MIRALAX) 17 g packet Take 1 packet by mouth daily as needed for constipation.      amoxicillin (AMOXIL) 500 MG capsule Take 2,000 mg by mouth once as needed (1 hour prior to dental appointment)      aspirin 81 MG EC tablet Take 81 mg by mouth 2 times daily (with meals)      atenolol (TENORMIN) 50 MG tablet Take 50 mg by mouth 2 times daily      Calcium Carb-Cholecalciferol (OS-MARLA CALCIUM + D3 PO) Take 1 tablet by mouth 2 times daily      co-enzyme Q-10 100 MG CAPS capsule Take 100 mg by mouth daily      DULoxetine HCl 60 MG CSDR Take 90 mg by mouth daily.      ferrous sulfate (FE TABS) 325 (65 Fe) MG EC tablet Take 325 mg by mouth daily      folic acid (FOLVITE) 1 MG tablet Take 2 mg by mouth daily      furosemide (LASIX) 40 MG tablet Take 40 mg by mouth daily      hydroxychloroquine (PLAQUENIL) 200 MG tablet Take 200 mg by mouth 2 times daily      ibuprofen (ADVIL/MOTRIN) 600 MG tablet Take 600 mg by mouth every 6 hours as needed for moderate pain.      isosorbide dinitrate (ISORDIL) 20 MG tablet Take 20 mg by mouth 3 times daily      methocarbamol (ROBAXIN) 500 MG tablet Take 500 mg by mouth every 6 hours as needed for  muscle spasms (1st line pain management)      methotrexate 1.25 MG half-tablet Take 20 mg by mouth once a week      nitroGLYcerin (NITROSTAT) 0.4 MG sublingual tablet Place 0.4 mg under the tongue every 5 minutes as needed for chest pain For chest pain place 1 tablet under the tongue every 5 minutes for 3 doses. If symptoms persist 5 minutes after 1st dose call 911.      potassium chloride ER (K-TAB) 20 MEQ CR tablet Take 20 mEq by mouth daily      rosuvastatin (CRESTOR) 20 MG tablet Take 20 mg by mouth daily      senna-docusate (SENOKOT-S/PERICOLACE) 8.6-50 MG tablet Take 1 tablet by mouth 2 times daily +BID PRN      traZODone (DESYREL) 100 MG tablet Take 100 mg by mouth at bedtime        Controlled medications:   None.  Has not needed Oxycodone .  Will discontinue      Past Medical History: No past medical history on file.  Physical Exam:   Vitals: /75   Pulse 84   Temp 98.4  F (36.9  C)   Resp 16   Wt 93.3 kg (205 lb 9.6 oz)   SpO2 98%   BMI 35.29 kg/m    BMI: Body mass index is 35.29 kg/m .  GENERAL APPEARANCE:  Alert, in no distress, cooperative, sitting comfortably in WC  RESP:  lungs clear to auscultation , no respiratory distress  M/S:   Gait and station abnormal limited WB, decreased ROM LLE post op. Generalized weakness. Trace-1+ BLEE  SKIN:  wound covered with negative pressure dressing  PSYCH:  normal insight, judgement and memory, affect and mood anxious re:  potential discharge      SNF labs: Recent labs in [facility] reviewed by me today.  and Most Recent 3 CBC's:  Recent Labs   Lab Test 08/19/24  0830 07/22/24  1109 07/08/24  0900   WBC 5.2 7.6 6.0   HGB 9.2* 10.0* 7.3*  7.4*   MCV 92 95 100    223 203     Most Recent 3 BMP's:  Recent Labs   Lab Test 08/28/24  0700 08/19/24  0830 07/03/24  0924     --  138   POTASSIUM 3.6  --  3.7   CHLORIDE 106  --  104   CO2 29  --  25   BUN 10.8  --  13.6   CR 0.75 0.72 0.69   ANIONGAP 9  --  9   MARLA 9.3  --  8.4*   GLC 89  --  93     Most  Recent ESR & CRP:No lab results found.    DISCHARGE PLAN:  Follow up labs: No labs orders/due  Medical Follow Up:      Follow up with primary care provider in 1-2 weeks  Follow up with ID and Ortho as scheduled        Discharge Services: Home Care:  Occupational Therapy, Physical Therapy, Registered Nurse, and Home Health Aide  Discharge Instructions Verbalized to Patient at Discharge:   Home safety  Wound care  Home care  Pain management    TOTAL DISCHARGE TIME:   Greater than 30 minutes  Electronically signed by:  JOHN Olmos CNP     Documentation of Face to Face and Certification for Home Health Services    I certify that services are/were furnished while this patient was under the care of a physician and that a physician or an allowed non-physician practitioner (NPP), had a face-to-face encounter that meets the physician face-to-face encounter requirements. The encounter was in whole, or in part, related to the primary reason for home health. The patient is confined to his/her home and needs intermittent skilled nursing, physical therapy, speech-language pathology, or the continued need for occupational therapy. A plan of care has been established by a physician and is periodically reviewed by a physician.  Date of Face-to-Face Encounter: 8/30/2024.    I certify that, based on my findings, the following services are medically necessary home health services: Nursing, Occupational Therapy, Physical Therapy, and HHA .    My clinical findings support the need for the above skilled services because: Requires assistance of another person or specialized equipment to access medical services because patient: Range of motion limitations prevents ability to exit home safely. and  limited WB, WC..    Patient to re-establish plan of care with their PCP within 7-10 days after leaving the facility to reestablish care.  Medicare certified PECOS provider: JOHN Olmos CNP  Date: August 30, 2024

## 2024-08-30 NOTE — LETTER
8/30/2024      Lana Moyer  [address]  [address]  [place] [address]        [facility] GERIATRICS DISCHARGE SUMMARY  PATIENT'S NAME: Lana Moyer  YOB: 1950  MEDICAL RECORD NUMBER:  [id number]  Place of Service where encounter took place:  [facility] (TCU) [[id number]]    PRIMARY CARE PROVIDER AND CLINIC RESPONSIBLE AFTER TRANSFER:   Provider Outside, No address on file    Non-G Provider     Transferring providers: JOHN Olmos CNP,   Recent Hospitalization/ED:  [facility]  stay 7/26 to 8/2/24.  Date of SNF Admission:  8-3-24  Date of SNF (anticipated) Discharge:  potentially 8/31 or 9/1/24  Discharged to: previous senior apartment  Cognitive Scores: SLUMS: 26/30  Physical Function:  TTWB, requires A of 1 for transfers.  WC mobility  DME: Wheelchair and Walker    CODE STATUS/ADVANCE DIRECTIVES DISCUSSION:  Full Code   ALLERGIES: Patient has no known allergies.    NURSING FACILITY COURSE   Medication Changes/Rationale:   Oxycodone decreased to 5mg PO BID PRN and discontinued on 8/30 8/25-IV antibiotics discontinued and Augmentin started per ID for long term suppression, at least 3-6 months    Summary of nursing facility stay:   Admitted to TCU following surgical procedure to left hip.  Completed multiple wash outs of wound surgically.  Continues with wound vac to wound being changed 3x/weekly. TTWB to LLE.  Transfers A of 1.  Manual wc for main mobility in community    Infection of prosthetic joint, subsequent encounter  Periprosthetic fracture of femur following total replacement of hip, sequela  Status post total replacement of left hip  Orthopedic aftercare  Encounter for management of wound VAC  Muscle Weakness  Has made progress with therapies, but does not feel ready for discharge.  Is appealing.  Concerned about living alone, needing SBA. Daughter and/or sister to stay with her.     Home care referral  Wound vac remains in place, minimal  output      -Pain managed with PRN Tylenol or Ibuprofen  -started Augmentin--continue long term, follow with ID  -Follow with ortho 9/5  --Follow with ID 9/24  --Follow labs-post op anemia, long term antibiotics  --home care for ongoing therapy, home safety      Rheumatoid Arthritis  Complicates rehab, mobility  -Continues on Plaquenil, Methotrexate, Folic Acid      ASHD    --Continues with PTA ASA, Atenolol, Furosemide (and Kcl), Isosorbide, Rosuvastatin, has nitroglycerin available      Discharge Medications:  MED REC REQUIRED  Post Medication Reconciliation Status: meds reconciled       Current Outpatient Medications   Medication Sig Dispense Refill     acetaminophen (TYLENOL) 500 MG tablet Take 500-1,000 mg by mouth every 6 hours as needed for mild pain or pain.       amoxicillin-clavulanate (AUGMENTIN) 875-125 MG tablet Take 1 tablet by mouth 2 times daily.       polyethylene glycol (MIRALAX) 17 g packet Take 1 packet by mouth daily as needed for constipation.       amoxicillin (AMOXIL) 500 MG capsule Take 2,000 mg by mouth once as needed (1 hour prior to dental appointment)       aspirin 81 MG EC tablet Take 81 mg by mouth 2 times daily (with meals)       atenolol (TENORMIN) 50 MG tablet Take 50 mg by mouth 2 times daily       Calcium Carb-Cholecalciferol (OS-MARLA CALCIUM + D3 PO) Take 1 tablet by mouth 2 times daily       co-enzyme Q-10 100 MG CAPS capsule Take 100 mg by mouth daily       DULoxetine HCl 60 MG CSDR Take 90 mg by mouth daily.       ferrous sulfate (FE TABS) 325 (65 Fe) MG EC tablet Take 325 mg by mouth daily       folic acid (FOLVITE) 1 MG tablet Take 2 mg by mouth daily       furosemide (LASIX) 40 MG tablet Take 40 mg by mouth daily       hydroxychloroquine (PLAQUENIL) 200 MG tablet Take 200 mg by mouth 2 times daily       ibuprofen (ADVIL/MOTRIN) 600 MG tablet Take 600 mg by mouth every 6 hours as needed for moderate pain.       isosorbide dinitrate (ISORDIL) 20 MG tablet Take 20 mg by mouth 3  times daily       methocarbamol (ROBAXIN) 500 MG tablet Take 500 mg by mouth every 6 hours as needed for muscle spasms (1st line pain management)       methotrexate 1.25 MG half-tablet Take 20 mg by mouth once a week       nitroGLYcerin (NITROSTAT) 0.4 MG sublingual tablet Place 0.4 mg under the tongue every 5 minutes as needed for chest pain For chest pain place 1 tablet under the tongue every 5 minutes for 3 doses. If symptoms persist 5 minutes after 1st dose call 911.       potassium chloride ER (K-TAB) 20 MEQ CR tablet Take 20 mEq by mouth daily       rosuvastatin (CRESTOR) 20 MG tablet Take 20 mg by mouth daily       senna-docusate (SENOKOT-S/PERICOLACE) 8.6-50 MG tablet Take 1 tablet by mouth 2 times daily +BID PRN       traZODone (DESYREL) 100 MG tablet Take 100 mg by mouth at bedtime        Controlled medications:   None.  Has not needed Oxycodone .  Will discontinue      Past Medical History: No past medical history on file.  Physical Exam:   Vitals: /75   Pulse 84   Temp 98.4  F (36.9  C)   Resp 16   Wt 93.3 kg (205 lb 9.6 oz)   SpO2 98%   BMI 35.29 kg/m    BMI: Body mass index is 35.29 kg/m .  GENERAL APPEARANCE:  Alert, in no distress, cooperative, sitting comfortably in WC  RESP:  lungs clear to auscultation , no respiratory distress  M/S:   Gait and station abnormal limited WB, decreased ROM LLE post op. Generalized weakness. Trace-1+ BLEE  SKIN:  wound covered with negative pressure dressing  PSYCH:  normal insight, judgement and memory, affect and mood anxious re:  potential discharge      SNF labs: Recent labs in [facility] reviewed by me today.  and Most Recent 3 CBC's:  Recent Labs   Lab Test 08/19/24  0830 07/22/24  1109 07/08/24  0900   WBC 5.2 7.6 6.0   HGB 9.2* 10.0* 7.3*  7.4*   MCV 92 95 100    223 203     Most Recent 3 BMP's:  Recent Labs   Lab Test 08/28/24  0700 08/19/24  0830 07/03/24  0924     --  138   POTASSIUM 3.6  --  3.7   CHLORIDE 106  --  104   CO2 29  --  25    BUN 10.8  --  13.6   CR 0.75 0.72 0.69   ANIONGAP 9  --  9   MARLA 9.3  --  8.4*   GLC 89  --  93     Most Recent ESR & CRP:No lab results found.    DISCHARGE PLAN:  Follow up labs: No labs orders/due  Medical Follow Up:      Follow up with primary care provider in 1-2 weeks  Follow up with ID and Ortho as scheduled        Discharge Services: Home Care:  Occupational Therapy, Physical Therapy, Registered Nurse, and Home Health Aide  Discharge Instructions Verbalized to Patient at Discharge:   Home safety  Wound care  Home care  Pain management    TOTAL DISCHARGE TIME:   Greater than 30 minutes  Electronically signed by:  JOHN Olmos CNP     Documentation of Face to Face and Certification for Home Health Services    I certify that services are/were furnished while this patient was under the care of a physician and that a physician or an allowed non-physician practitioner (NPP), had a face-to-face encounter that meets the physician face-to-face encounter requirements. The encounter was in whole, or in part, related to the primary reason for home health. The patient is confined to his/her home and needs intermittent skilled nursing, physical therapy, speech-language pathology, or the continued need for occupational therapy. A plan of care has been established by a physician and is periodically reviewed by a physician.  Date of Face-to-Face Encounter: 8/30/2024.    I certify that, based on my findings, the following services are medically necessary home health services: Nursing, Occupational Therapy, Physical Therapy, and HHA .    My clinical findings support the need for the above skilled services because: Requires assistance of another person or specialized equipment to access medical services because patient: Range of motion limitations prevents ability to exit home safely. and  limited WB, WC..    Patient to re-establish plan of care with their PCP within 7-10 days after leaving the facility to  reestablish care.  Medicare certified PECOS provider: JOHN Olmos CNP  Date: August 30, 2024                Sincerely,        JOHN Olmos CNP